# Patient Record
Sex: MALE | Race: WHITE | Employment: OTHER | URBAN - METROPOLITAN AREA
[De-identification: names, ages, dates, MRNs, and addresses within clinical notes are randomized per-mention and may not be internally consistent; named-entity substitution may affect disease eponyms.]

---

## 2021-01-20 ENCOUNTER — OFFICE VISIT (OUTPATIENT)
Dept: FAMILY MEDICINE CLINIC | Age: 81
End: 2021-01-20
Payer: MEDICARE

## 2021-01-20 VITALS
HEART RATE: 56 BPM | OXYGEN SATURATION: 96 % | DIASTOLIC BLOOD PRESSURE: 78 MMHG | HEIGHT: 75 IN | RESPIRATION RATE: 16 BRPM | TEMPERATURE: 98.6 F | SYSTOLIC BLOOD PRESSURE: 126 MMHG | BODY MASS INDEX: 23.13 KG/M2 | WEIGHT: 186 LBS

## 2021-01-20 DIAGNOSIS — I10 ESSENTIAL HYPERTENSION: Primary | ICD-10-CM

## 2021-01-20 DIAGNOSIS — E78.00 HYPERCHOLESTEREMIA: ICD-10-CM

## 2021-01-20 DIAGNOSIS — D48.5 NEOPLASM OF UNCERTAIN BEHAVIOR OF SKIN: ICD-10-CM

## 2021-01-20 PROCEDURE — G8427 DOCREV CUR MEDS BY ELIG CLIN: HCPCS | Performed by: FAMILY MEDICINE

## 2021-01-20 PROCEDURE — G8510 SCR DEP NEG, NO PLAN REQD: HCPCS | Performed by: FAMILY MEDICINE

## 2021-01-20 PROCEDURE — G8754 DIAS BP LESS 90: HCPCS | Performed by: FAMILY MEDICINE

## 2021-01-20 PROCEDURE — G8536 NO DOC ELDER MAL SCRN: HCPCS | Performed by: FAMILY MEDICINE

## 2021-01-20 PROCEDURE — G8752 SYS BP LESS 140: HCPCS | Performed by: FAMILY MEDICINE

## 2021-01-20 PROCEDURE — G8420 CALC BMI NORM PARAMETERS: HCPCS | Performed by: FAMILY MEDICINE

## 2021-01-20 PROCEDURE — 1101F PT FALLS ASSESS-DOCD LE1/YR: CPT | Performed by: FAMILY MEDICINE

## 2021-01-20 PROCEDURE — 99203 OFFICE O/P NEW LOW 30 MIN: CPT | Performed by: FAMILY MEDICINE

## 2021-01-20 RX ORDER — ATORVASTATIN CALCIUM 20 MG/1
TABLET, FILM COATED ORAL
COMMUNITY
Start: 2020-10-06 | End: 2021-02-03 | Stop reason: SDUPTHER

## 2021-01-20 RX ORDER — AMLODIPINE BESYLATE 5 MG/1
TABLET ORAL
COMMUNITY
Start: 2020-10-06 | End: 2021-07-20 | Stop reason: SDUPTHER

## 2021-01-20 RX ORDER — TAMSULOSIN HYDROCHLORIDE 0.4 MG/1
CAPSULE ORAL
COMMUNITY
Start: 2020-10-21

## 2021-01-20 RX ORDER — LISINOPRIL 10 MG/1
TABLET ORAL
COMMUNITY
Start: 2020-10-06 | End: 2021-02-03 | Stop reason: SDUPTHER

## 2021-01-20 RX ORDER — FINASTERIDE 5 MG/1
TABLET, FILM COATED ORAL
COMMUNITY
Start: 2020-10-21

## 2021-01-20 RX ORDER — EZETIMIBE 10 MG/1
TABLET ORAL
COMMUNITY
Start: 2020-10-06 | End: 2021-02-03 | Stop reason: SDUPTHER

## 2021-01-20 NOTE — PROGRESS NOTES
Identified pt with two pt identifiers(name and ). Reviewed record in preparation for visit and have obtained necessary documentation. Chief Complaint   Patient presents with   [address] [facility] Due   Topic    COVID-19 Vaccine (1 of 2)    DTaP/Tdap/Td series (1 - Tdap)    Shingrix Vaccine Age 50> (1 of 2)    GLAUCOMA SCREENING Q2Y     Pneumococcal 65+ years (1 of 1 - PPSV23)    Flu Vaccine (1)    Medicare Yearly Exam    Patient declined flu vaccine    Coordination of Care Questionnaire:  :   1) Have you been to an emergency room, urgent care, or hospitalized since your last visit? If yes, where when, and reason for visit? no    2. Have seen or consulted any other health care provider since your last visit? If yes, where when, and reason for visit? Dr. Reginald Loza         Patient is accompanied by self I have received verbal consent from Shaun Rahman to discuss any/all medical information while they are present in the room.

## 2021-01-20 NOTE — PROGRESS NOTES
Assessment and Plan    1. Essential hypertension  No med changes  Continue diet and exercise    2. Hypercholesteremia  Continue meds  FU PRN    3. Suspicious mole mid back   Sees DERM tomorrow. Get us old records from [facility] and [place]    Follow-up and Dispositions    · Return in about 6 months (around 7/20/2021) for Blood pressure follow up, Medication follow up. Diagnosis and plan discussed with patient who verbillized understanding. History of present Harinder Plunkett is a 68 y.o. male presenting for [place] [address] to [facility] from     Hypertension  At goal today  Takes meds consistently    Hypercholesterolemia  On statin and zetia    Saw UROLOGY in [facility] tamsulosin and finasteride from new UROLOGIST here. Dr. Marie [place]. Review of Systems   Constitutional: Negative for chills, fever and weight loss. Respiratory: Negative for cough and shortness of breath. Cardiovascular: Negative for chest pain, palpitations and leg swelling. Psychiatric/Behavioral: Negative.           Past Medical History:   Diagnosis Date    BPH (benign prostatic hyperplasia)     Dog bite 2020    right hand, hospitalized with IV antibiotics    Hypercholesterolemia     Hypertension      Past Surgical History:   Procedure Laterality Date    HX CARPAL TUNNEL RELEASE       Family History   Problem Relation Age of Onset    Cancer Mother     Heart Attack Father      Social History     Socioeconomic History    Marital status:      Spouse name: Not on file    Number of children: Not on file    Years of education: Not on file    Highest education level: Not on file   Occupational History    Not on file   Social Needs    Financial resource strain: Not on file    Food insecurity     Worry: Not on file     Inability: Not on file    Transportation needs     Medical: Not on file     Non-medical: Not on file   Tobacco Use    Smoking status: Never Smoker    Smokeless tobacco: Never Used   Substance and Sexual Activity    Alcohol use: Yes     Alcohol/week: 1.0 standard drinks     Types: 1 Glasses of wine per week     Frequency: 4 or more times a week     Drinks per session: 5 or 6     Binge frequency: Never    Drug use: Never    Sexual activity: Yes     Partners: Female   Lifestyle    Physical activity     Days per week: Not on file     Minutes per session: Not on file    Stress: Not on file   Relationships    Social connections     Talks on phone: Not on file     Gets together: Not on file     Attends Hoahaoism service: Not on file     Active member of club or organization: Not on file     Attends meetings of clubs or organizations: Not on file     Relationship status: Not on file    Intimate partner violence     Fear of current or ex partner: Not on file     Emotionally abused: Not on file     Physically abused: Not on file     Forced sexual activity: Not on file   Other Topics Concern    Not on file   Social History Narrative    Not on file         Prior to Admission medications    Medication Sig Start Date End Date Taking? Authorizing Provider   amLODIPine (NORVASC) 5 mg tablet amlodipine 5 mg tablet 10/6/20  Yes Provider, Historical   atorvastatin (LIPITOR) 20 mg tablet atorvastatin 20 mg tablet 10/6/20  Yes Provider, Historical   finasteride (PROSCAR) 5 mg tablet TK 1 T PO  ONCE D 10/21/20  Yes Provider, Historical   lisinopriL (PRINIVIL, ZESTRIL) 10 mg tablet lisinopril 10 mg tablet 10/6/20  Yes Provider, Historical   tamsulosin (FLOMAX) 0.4 mg capsule tamsulosin 0.4 mg capsule 10/21/20  Yes Provider, Historical   ezetimibe (ZETIA) 10 mg tablet ezetimibe 10 mg tablet 10/6/20  Yes Provider, Historical        No Known Allergies    Vitals:    01/20/21 1345   BP: 126/78   Pulse: (!) 56   Resp: 16   Temp: 98.6 °F (37 °C)   TempSrc: Temporal   SpO2: 96%   Weight: 186 lb (84.4 kg)   Height: 6' 3\" (1.905 m)     Body mass index is 23.25 kg/m².       Objective  Physical Exam  Vitals signs and nursing note reviewed. Constitutional:       Appearance: Normal appearance. He is not toxic-appearing. HENT:      Head: Normocephalic and atraumatic. Neck:      Musculoskeletal: No muscular tenderness. Cardiovascular:      Rate and Rhythm: Normal rate and regular rhythm. Heart sounds: Normal heart sounds. No murmur. No gallop. Pulmonary:      Effort: Pulmonary effort is normal. No respiratory distress. Breath sounds: Normal breath sounds. No wheezing, rhonchi or rales. Lymphadenopathy:      Cervical: No cervical adenopathy. Neurological:      Mental Status: He is alert. Psychiatric:         Mood and Affect: Mood normal.         Behavior: Behavior normal.         Thought Content:  Thought content normal.         Judgment: Judgment normal.       One suspicious mole mid back  Sees DERM tomorrow and will point that lesion out to them

## 2021-02-03 RX ORDER — EZETIMIBE 10 MG/1
10 TABLET ORAL DAILY
Qty: 90 TAB | Refills: 1 | Status: SHIPPED | OUTPATIENT
Start: 2021-02-03 | End: 2021-06-16

## 2021-02-03 RX ORDER — LISINOPRIL 10 MG/1
10 TABLET ORAL DAILY
Qty: 90 TAB | Refills: 1 | Status: SHIPPED | OUTPATIENT
Start: 2021-02-03 | End: 2021-06-16

## 2021-02-03 RX ORDER — ATORVASTATIN CALCIUM 20 MG/1
20 TABLET, FILM COATED ORAL DAILY
Qty: 90 TAB | Refills: 1 | Status: SHIPPED | OUTPATIENT
Start: 2021-02-03 | End: 2021-06-16

## 2021-02-03 NOTE — TELEPHONE ENCOUNTER
Requested Prescriptions     Pending Prescriptions Disp Refills    lisinopriL (PRINIVIL, ZESTRIL) 10 mg tablet       ezetimibe (ZETIA) 10 mg tablet       atorvastatin (LIPITOR) 20 mg tablet

## 2021-07-23 ENCOUNTER — OFFICE VISIT (OUTPATIENT)
Dept: FAMILY MEDICINE CLINIC | Age: 81
End: 2021-07-23
Payer: MEDICARE

## 2021-07-23 VITALS
HEART RATE: 63 BPM | TEMPERATURE: 98.2 F | OXYGEN SATURATION: 98 % | BODY MASS INDEX: 23.4 KG/M2 | SYSTOLIC BLOOD PRESSURE: 129 MMHG | RESPIRATION RATE: 16 BRPM | WEIGHT: 188.2 LBS | DIASTOLIC BLOOD PRESSURE: 78 MMHG | HEIGHT: 75 IN

## 2021-07-23 DIAGNOSIS — I10 ESSENTIAL HYPERTENSION: ICD-10-CM

## 2021-07-23 DIAGNOSIS — M25.561 ACUTE PAIN OF RIGHT KNEE: Primary | ICD-10-CM

## 2021-07-23 DIAGNOSIS — E78.00 HYPERCHOLESTEREMIA: ICD-10-CM

## 2021-07-23 DIAGNOSIS — Z11.59 ENCOUNTER FOR HEPATITIS C SCREENING TEST FOR LOW RISK PATIENT: ICD-10-CM

## 2021-07-23 PROCEDURE — G8510 SCR DEP NEG, NO PLAN REQD: HCPCS | Performed by: FAMILY MEDICINE

## 2021-07-23 PROCEDURE — 1101F PT FALLS ASSESS-DOCD LE1/YR: CPT | Performed by: FAMILY MEDICINE

## 2021-07-23 PROCEDURE — 99213 OFFICE O/P EST LOW 20 MIN: CPT | Performed by: FAMILY MEDICINE

## 2021-07-23 PROCEDURE — G8752 SYS BP LESS 140: HCPCS | Performed by: FAMILY MEDICINE

## 2021-07-23 PROCEDURE — G8536 NO DOC ELDER MAL SCRN: HCPCS | Performed by: FAMILY MEDICINE

## 2021-07-23 PROCEDURE — G8754 DIAS BP LESS 90: HCPCS | Performed by: FAMILY MEDICINE

## 2021-07-23 PROCEDURE — G8420 CALC BMI NORM PARAMETERS: HCPCS | Performed by: FAMILY MEDICINE

## 2021-07-23 PROCEDURE — G8427 DOCREV CUR MEDS BY ELIG CLIN: HCPCS | Performed by: FAMILY MEDICINE

## 2021-07-23 NOTE — PROGRESS NOTES
Corwin Turk  80 y.o. male  1940  [id number]  [facility]  Progress Note     Encounter Date: 7/23/2021    Assessment and Plan:     Encounter Diagnoses     ICD-10-CM ICD-9-CM   1. Acute pain of right knee  M25.561 719.46   2. Essential hypertension  I10 401.9   3. Hypercholesteremia  E78.00 272.0   4. Encounter for hepatitis C screening test for low risk patient  Z11.59 V73.89       1. Essential hypertension  At goal.  Labs for upcoming physical  - METABOLIC PANEL, BASIC; Future  - MICROALBUMIN, UR, RAND W/ MICROALB/CREAT RATIO; Future    2. Hypercholesteremia  Continues statin and zetia,  - LIPID PANEL; Future  - HEPATIC FUNCTION PANEL; Future    3. Encounter for hepatitis C screening test for low risk patient  screening  - HEPATITIS C AB; Future    4. Acute pain of right knee  Possible meniscal injury. ICE BID for 20 minutes  Continue gentle stretching  Aleve, check routine xray  MRI and to ortho if sx continue.  - XR KNEE RT MAX 2 VWS; Future      I have discussed the diagnosis with the patient and the intended plan as seen in the above orders. he has expressed understanding. The patient has received an after-visit summary and questions were answered concerning future plans. I have discussed medication side effects and warnings with the patient as well. Electronically Signed: Kevin Daugherty MD    Current Medications after this visit     Current Outpatient Medications   Medication Sig    amLODIPine (NORVASC) 5 mg tablet One by mouth daily    atorvastatin (LIPITOR) 20 mg tablet TAKE 1 TABLET BY MOUTH  DAILY    ezetimibe (ZETIA) 10 mg tablet TAKE 1 TABLET BY MOUTH  DAILY    lisinopriL (PRINIVIL, ZESTRIL) 10 mg tablet TAKE 1 TABLET BY MOUTH  DAILY    finasteride (PROSCAR) 5 mg tablet TK 1 T PO  ONCE D    tamsulosin (FLOMAX) 0.4 mg capsule tamsulosin 0.4 mg capsule     No current facility-administered medications for this visit.      There are no discontinued medications. ~~~~~~~~~~~~~~~~~~~~~~~~~~~~~~~~~~~~~~~~~~~~~~~~~~~~~~~~~~~    Chief Complaint   Patient presents with    Knee Injury     Occurred 6 weeks ago     Playing basketball and fell   [address]       History provided by patient  History of Present Illness   Dee Espinal is a 80 y.o. male who presents to clinic today for:  Knee Injury (Occurred 6 weeks ago     Playing basketball and fell) and Labs (Requesting Labs)    Knee injury with fall playing basketball  Right knee pain has persisted. Other knee and groin also injured but are better  Hurts in back of knee  Runs 5 miles daily without pain  No previous injuries to that knee  Describes pain on full flexion. Health Maintenance  Will do at future visit, Asked patient to schedule a Wellness appt to discuss issues. Health Maintenance Due   Topic Date Due    Hepatitis C Screening  Never done    Lipid Screen  Never done    DTaP/Tdap/Td series (1 - Tdap) Never done    Shingrix Vaccine Age 50> (1 of 2) Never done    Pneumococcal 65+ years (1 of 1 - PPSV23) Never done    Medicare Yearly Exam  Never done     Review of Systems   Review of Systems   Constitutional: Negative for chills, fever and weight loss. Respiratory: Negative for shortness of breath. Cardiovascular: Negative for chest pain. Musculoskeletal: Positive for joint pain. Psychiatric/Behavioral: Negative. Vitals/Objective:     Vitals:    07/23/21 1011 07/23/21 1018   BP: (!) 145/81 129/78   Pulse: 63    Resp: 16    Temp: 98.2 °F (36.8 °C)    TempSrc: Temporal    SpO2: 98%    Weight: 188 lb 3.2 oz (85.4 kg)    Height: 6' 3\" (1.905 m)      Body mass index is 23.52 kg/m².     Wt Readings from Last 3 Encounters:   07/23/21 188 lb 3.2 oz (85.4 kg)   01/20/21 186 lb (84.4 kg)         Objective  Physical Exam  General: Patient alert and oriented and in NAD  Musculoskeletal:  Knee: right  Knee Effusion: None  Quadriceps atrophy: None   Popliteal (Bakers) Cyst: Negative   Patellofemoral crepitus: Negative  Q angle: Normal    ROM:  Flexion: 130 Popliteal pain on full flexion. Extension: 0   Hip IR/ER: FROM without pain    Alignment:  Foot: Normal  Patellar tracking: Normal    Dynamic Test:  Gait: Normal   Assistive devices: None    Palpation:   Patella tenderness: None  Patellar tendon tenderness: None  Quad tendon tenderness: None  Medial joint line tenderness: None  Lateral joint line tenderness: None  MCL tenderness: None  LCL tenderness: None  Medial facet tenderness: None  Lateral facet tenderness: None  Condyle tenderness: None  Tibia tubercle tenderness: None  Proximal fibula tenderness: None  Plica tenderness: None  Prepatellar bursa tenderness: None  Pes bursa tenderness: None  ITB tenderness: None    Ligament/Meniscal Exam:  Patellar Grind: Negative   Patellar apprehension (medial/lateral): Negative   Lochman: Negative, with good endpoint   Anterior Drawer: Negative   Posterior Drawer: Negative   Valgus stress: Negative with good endpoint   Varus stress: Negative with good endpoint   Emma: Negative     Strength (0-5/5):   Flexion: Left: 5/5    Right: 5/5    Extension: Left: 5/5    Right: 5/5    Hip abduction: 5/5    Hip adduction: 5/5      Normal exterior appearance without signs of trauma  Normal pulses in ipsilateral foot. Psych: Appropriate mood and affect, no homicidal or suicidal ideation, no obsessions, delusions or hallucinations, normal psychomotor status. No results found for this or any previous visit (from the past 24 hour(s)). Disposition     Follow-up and Dispositions  ·   Return in about 1 month (around 8/23/2021) for Annual exam, Medication follow up. No future appointments. History   Patient's past medical, surgical and family histories were reviewed and updated.     Past Medical History:   Diagnosis Date    BPH (benign prostatic hyperplasia)     Dog bite 2020    right hand, hospitalized with IV antibiotics    Hypercholesterolemia     Hypertension      Past Surgical History:   Procedure Laterality Date    HX CARPAL TUNNEL RELEASE       Family History   Problem Relation Age of Onset    Cancer Mother     Heart Attack Father      Social History     Tobacco Use    Smoking status: Never Smoker    Smokeless tobacco: Never Used   Vaping Use    Vaping Use: Never used   Substance Use Topics    Alcohol use:  Yes     Alcohol/week: 1.0 standard drinks     Types: 1 Glasses of wine per week    Drug use: Never       Allergies   No Known Allergies

## 2021-07-23 NOTE — PROGRESS NOTES
Patient stated name &     Chief Complaint   Patient presents with    Knee Injury     Occurred 6 weeks ago     Playing basketball and fell   [address] Maintenance Due   Topic    Hepatitis C Screening     Lipid Screen     DTaP/Tdap/Td series (1 - Tdap)    Shingrix Vaccine Age 49> (1 of 2)    Pneumococcal 65+ years (1 of 1 - PPSV23)    Medicare Yearly Exam        Wt Readings from Last 3 Encounters:   21 188 lb 3.2 oz (85.4 kg)   21 186 lb (84.4 kg)     Temp Readings from Last 3 Encounters:   21 98.2 °F (36.8 °C) (Temporal)   21 98.6 °F (37 °C) (Temporal)     BP Readings from Last 3 Encounters:   21 (!) 145/81   21 126/78     Pulse Readings from Last 3 Encounters:   21 63   21 (!) 56         Learning Assessment:  :     No flowsheet data found. Depression Screening:  :     3 most recent PHQ Screens 2021   Little interest or pleasure in doing things Not at all   Feeling down, depressed, irritable, or hopeless Not at all   Total Score PHQ 2 0       Fall Risk Assessment:  :     Fall Risk Assessment, last 12 mths 2021   Able to walk? Yes   Fall in past 12 months? 0   Do you feel unsteady? 0   Are you worried about falling 0       Abuse Screening:  :     Abuse Screening Questionnaire 2021   Do you ever feel afraid of your partner? N   Are you in a relationship with someone who physically or mentally threatens you? N   Is it safe for you to go home? Y       Coordination of Care Questionnaire:  :     1) Have you been to an emergency room, urgent care clinic since your last visit? No    Hospitalized since your last visit? No             2) Have you seen or consulted any other health care providers outside of [address] [place] [address] since your last visit?  No  (Include any pap smears or colon screenings in this section.)    Patient is accompanied by self I have received verbal consent from Zenon Man to discuss any/all medical information while they are present in the room.

## 2021-07-28 ENCOUNTER — HOSPITAL ENCOUNTER (OUTPATIENT)
Dept: GENERAL RADIOLOGY | Age: 81
Discharge: HOME | End: 2021-07-28
Attending: FAMILY MEDICINE
Payer: MEDICARE

## 2021-07-28 DIAGNOSIS — M25.561 ACUTE PAIN OF RIGHT KNEE: ICD-10-CM

## 2021-07-28 PROCEDURE — 73560 X-RAY EXAM OF KNEE 1 OR 2: CPT

## 2021-07-28 NOTE — PROGRESS NOTES
The xrays only show mild arthritis changes under the kneecap. This does not exclude cartilage injuries so you should follow through on the plan and let me know if the symptoms resolve or continue. [facility]

## 2021-08-18 ENCOUNTER — OFFICE VISIT (OUTPATIENT)
Dept: FAMILY MEDICINE CLINIC | Age: 81
End: 2021-08-18
Payer: MEDICARE

## 2021-08-18 VITALS
TEMPERATURE: 97.7 F | DIASTOLIC BLOOD PRESSURE: 74 MMHG | WEIGHT: 184.2 LBS | BODY MASS INDEX: 22.9 KG/M2 | HEART RATE: 68 BPM | OXYGEN SATURATION: 97 % | RESPIRATION RATE: 12 BRPM | HEIGHT: 75 IN | SYSTOLIC BLOOD PRESSURE: 108 MMHG

## 2021-08-18 DIAGNOSIS — M25.561 ACUTE PAIN OF RIGHT KNEE: Primary | ICD-10-CM

## 2021-08-18 PROCEDURE — 99213 OFFICE O/P EST LOW 20 MIN: CPT | Performed by: FAMILY MEDICINE

## 2021-08-18 PROCEDURE — G8427 DOCREV CUR MEDS BY ELIG CLIN: HCPCS | Performed by: FAMILY MEDICINE

## 2021-08-18 PROCEDURE — 1101F PT FALLS ASSESS-DOCD LE1/YR: CPT | Performed by: FAMILY MEDICINE

## 2021-08-18 PROCEDURE — G8510 SCR DEP NEG, NO PLAN REQD: HCPCS | Performed by: FAMILY MEDICINE

## 2021-08-18 PROCEDURE — G8752 SYS BP LESS 140: HCPCS | Performed by: FAMILY MEDICINE

## 2021-08-18 PROCEDURE — G8536 NO DOC ELDER MAL SCRN: HCPCS | Performed by: FAMILY MEDICINE

## 2021-08-18 PROCEDURE — G8754 DIAS BP LESS 90: HCPCS | Performed by: FAMILY MEDICINE

## 2021-08-18 PROCEDURE — G8420 CALC BMI NORM PARAMETERS: HCPCS | Performed by: FAMILY MEDICINE

## 2021-08-18 NOTE — PROGRESS NOTES
1. Have you been to the ER, urgent care clinic since your last visit? No    Hospitalized since your last visit? No  2. Have you seen or consulted any other health care providers outside of the [address] [place] [address] since your last visit  No   Include any pap smears or colon screening.

## 2021-08-18 NOTE — PROGRESS NOTES
Ashia Feliciano  80 y.o. male  1940  [id number]  [facility]  Progress Note     Encounter Date: 8/18/2021    Assessment and Plan:     Encounter Diagnoses     ICD-10-CM ICD-9-CM   1. Acute pain of right knee  M25.561 719.46       1. Acute pain of right knee  Xrays not much help. Confirms effusion  TO Dr Gaye Lozoya for evaluation and possible MRI.  - REFERRAL TO ORTHOPEDICS      I have discussed the diagnosis with the patient and the intended plan as seen in the above orders. he has expressed understanding. The patient has received an after-visit summary and questions were answered concerning future plans. I have discussed medication side effects and warnings with the patient as well. Electronically Signed: Vaishnavi Boykin MD    Current Medications after this visit     Current Outpatient Medications   Medication Sig    amLODIPine (NORVASC) 5 mg tablet One by mouth daily    atorvastatin (LIPITOR) 20 mg tablet TAKE 1 TABLET BY MOUTH  DAILY    ezetimibe (ZETIA) 10 mg tablet TAKE 1 TABLET BY MOUTH  DAILY    lisinopriL (PRINIVIL, ZESTRIL) 10 mg tablet TAKE 1 TABLET BY MOUTH  DAILY    finasteride (PROSCAR) 5 mg tablet TK 1 T PO  ONCE D    tamsulosin (FLOMAX) 0.4 mg capsule tamsulosin 0.4 mg capsule     No current facility-administered medications for this visit. There are no discontinued medications. ~~~~~~~~~~~~~~~~~~~~~~~~~~~~~~~~~~~~~~~~~~~~~~~~~~~~~~~~~~~    Chief Complaint   Patient presents with    Knee Pain     fell playing basketball right knee    Follow-up       History provided by patient  History of Present Illness   Ashia Feliciano is a 80 y.o. male who presents to clinic today for:  Knee Pain (fell playing basketball right knee) and Follow-up    Ongoing pain right knee  Had xrays  No real change in symptoms. Still running. Hurts on full flexion posterior lateral knee    Labs were fine, reviewed with patient.     Health Maintenance  Will do at future visit  Health Maintenance Due   Topic Date Due    DTaP/Tdap/Td series (1 - Tdap) Never done    Shingrix Vaccine Age 50> (1 of 2) Never done    Pneumococcal 65+ years (1 of 1 - PPSV23) Never done    Medicare Yearly Exam  Never done     Review of Systems   Review of Systems   Musculoskeletal: Positive for joint pain. Psychiatric/Behavioral: Negative. Vitals/Objective:     Vitals:    08/18/21 1347   BP: 108/74   Pulse: 68   Resp: 12   Temp: 97.7 °F (36.5 °C)   TempSrc: Temporal   SpO2: 97%   Weight: 184 lb 3.2 oz (83.6 kg)   Height: 6' 3\" (1.905 m)     Body mass index is 23.02 kg/m².     Wt Readings from Last 3 Encounters:   08/18/21 184 lb 3.2 oz (83.6 kg)   07/23/21 188 lb 3.2 oz (85.4 kg)   01/20/21 186 lb (84.4 kg)         Objective  Physical Exam  General: Patient alert and oriented and in NAD  Musculoskeletal:  Knee: right  Knee Effusion: 1+  Quadriceps atrophy: None   Popliteal (Bakers) Cyst: Negative   Patellofemoral crepitus: Negative  Q angle: Normal    ROM:  Flexion: 130 pain on full flexion  Extension: 0   Hip IR/ER: FROM without pain    Alignment:  Foot: Normal  Patellar tracking: Normal    Dynamic Test:  Gait: Normal   Assistive devices: None    Palpation:   Patella tenderness: None  Patellar tendon tenderness: None  Quad tendon tenderness: None  Medial joint line tenderness: None  Lateral joint line tenderness: None  MCL tenderness: None  LCL tenderness: None  Medial facet tenderness: None  Lateral facet tenderness: None  Condyle tenderness: None  Tibia tubercle tenderness: None  Proximal fibula tenderness: None  Plica tenderness: None  Prepatellar bursa tenderness: None  Pes bursa tenderness: None  ITB tenderness: None    Ligament/Meniscal Exam:  Patellar Grind: Negative   Patellar apprehension (medial/lateral): Negative   Lochman: Negative, with good endpoint   Anterior Drawer: Negative   Posterior Drawer: Negative   Valgus stress: Negative with good endpoint   Varus stress: Negative with good endpoint   Emma: Negative  . Strength (0-5/5):   Flexion: Left: 5/5    Right: 5/5    Extension: Left: 5/5    Right: 5/5    Hip abduction: 5/5    Hip adduction: 5/5      Normal exterior appearance without signs of trauma    Psych: Appropriate mood and affect, no homicidal or suicidal ideation, no obsessions, delusions or hallucinations, normal psychomotor status. No results found for this or any previous visit (from the past 24 hour(s)). Disposition     Follow-up and Dispositions  ·   Return in about 6 months (around 2/18/2022) for Medication follow up. No future appointments. History   Patient's past medical, surgical and family histories were reviewed and updated. Past Medical History:   Diagnosis Date    BPH (benign prostatic hyperplasia)     Dog bite 2020    right hand, hospitalized with IV antibiotics    Hypercholesterolemia     Hypertension      Past Surgical History:   Procedure Laterality Date    HX CARPAL TUNNEL RELEASE       Family History   Problem Relation Age of Onset    Cancer Mother     Heart Attack Father      Social History     Tobacco Use    Smoking status: Never Smoker    Smokeless tobacco: Never Used   Vaping Use    Vaping Use: Never used   Substance Use Topics    Alcohol use:  Yes     Alcohol/week: 1.0 standard drinks     Types: 1 Glasses of wine per week    Drug use: Never       Allergies   No Known Allergies

## 2021-11-22 RX ORDER — LISINOPRIL 10 MG/1
TABLET ORAL
Qty: 90 TABLET | Refills: 1 | Status: SHIPPED | OUTPATIENT
Start: 2021-11-22 | End: 2022-04-28

## 2021-11-22 RX ORDER — EZETIMIBE 10 MG/1
TABLET ORAL
Qty: 90 TABLET | Refills: 1 | Status: SHIPPED | OUTPATIENT
Start: 2021-11-22 | End: 2022-04-28

## 2021-11-22 RX ORDER — ATORVASTATIN CALCIUM 20 MG/1
TABLET, FILM COATED ORAL
Qty: 90 TABLET | Refills: 1 | Status: SHIPPED | OUTPATIENT
Start: 2021-11-22 | End: 2022-04-28

## 2021-11-28 RX ORDER — AMLODIPINE BESYLATE 5 MG/1
TABLET ORAL
Qty: 90 TABLET | Refills: 1 | Status: SHIPPED | OUTPATIENT
Start: 2021-11-28 | End: 2022-05-04

## 2022-02-09 ENCOUNTER — TRANSCRIBE ORDER (OUTPATIENT)
Dept: SCHEDULING | Age: 82
End: 2022-02-09

## 2022-02-09 DIAGNOSIS — M47.22 CERVICAL SPONDYLOSIS WITH RADICULOPATHY: ICD-10-CM

## 2022-02-09 DIAGNOSIS — M54.12 CERVICAL RADICULOPATHY: ICD-10-CM

## 2022-02-09 DIAGNOSIS — M43.12 ACQUIRED SPONDYLOLISTHESIS OF CERVICAL VERTEBRA: Primary | ICD-10-CM

## 2022-02-15 ENCOUNTER — HOSPITAL ENCOUNTER (OUTPATIENT)
Dept: MRI IMAGING | Age: 82
Discharge: HOME | End: 2022-02-15
Attending: ORTHOPAEDIC SURGERY
Payer: MEDICARE

## 2022-02-15 DIAGNOSIS — M54.12 CERVICAL RADICULOPATHY: ICD-10-CM

## 2022-02-15 DIAGNOSIS — M47.22 CERVICAL SPONDYLOSIS WITH RADICULOPATHY: ICD-10-CM

## 2022-02-15 DIAGNOSIS — M43.12 ACQUIRED SPONDYLOLISTHESIS OF CERVICAL VERTEBRA: ICD-10-CM

## 2022-02-15 PROCEDURE — 72141 MRI NECK SPINE W/O DYE: CPT

## 2022-06-02 ENCOUNTER — OFFICE VISIT (OUTPATIENT)
Dept: FAMILY MEDICINE CLINIC | Age: 82
End: 2022-06-02
Payer: MEDICARE

## 2022-06-02 VITALS
TEMPERATURE: 98 F | SYSTOLIC BLOOD PRESSURE: 142 MMHG | WEIGHT: 184.8 LBS | DIASTOLIC BLOOD PRESSURE: 83 MMHG | OXYGEN SATURATION: 100 % | RESPIRATION RATE: 17 BRPM | HEIGHT: 75 IN | BODY MASS INDEX: 22.98 KG/M2 | HEART RATE: 59 BPM

## 2022-06-02 DIAGNOSIS — I10 ESSENTIAL HYPERTENSION: ICD-10-CM

## 2022-06-02 DIAGNOSIS — N18.31 STAGE 3A CHRONIC KIDNEY DISEASE (HCC): ICD-10-CM

## 2022-06-02 DIAGNOSIS — Z00.00 MEDICARE ANNUAL WELLNESS VISIT, SUBSEQUENT: Primary | ICD-10-CM

## 2022-06-02 DIAGNOSIS — E78.00 HYPERCHOLESTEREMIA: ICD-10-CM

## 2022-06-02 PROBLEM — N18.30 CHRONIC RENAL DISEASE, STAGE III (HCC): Status: ACTIVE | Noted: 2022-06-02

## 2022-06-02 PROCEDURE — G8427 DOCREV CUR MEDS BY ELIG CLIN: HCPCS | Performed by: FAMILY MEDICINE

## 2022-06-02 PROCEDURE — G8753 SYS BP > OR = 140: HCPCS | Performed by: FAMILY MEDICINE

## 2022-06-02 PROCEDURE — G8754 DIAS BP LESS 90: HCPCS | Performed by: FAMILY MEDICINE

## 2022-06-02 PROCEDURE — G8510 SCR DEP NEG, NO PLAN REQD: HCPCS | Performed by: FAMILY MEDICINE

## 2022-06-02 PROCEDURE — G0439 PPPS, SUBSEQ VISIT: HCPCS | Performed by: FAMILY MEDICINE

## 2022-06-02 PROCEDURE — G8536 NO DOC ELDER MAL SCRN: HCPCS | Performed by: FAMILY MEDICINE

## 2022-06-02 PROCEDURE — G8420 CALC BMI NORM PARAMETERS: HCPCS | Performed by: FAMILY MEDICINE

## 2022-06-02 PROCEDURE — 99213 OFFICE O/P EST LOW 20 MIN: CPT | Performed by: FAMILY MEDICINE

## 2022-06-02 PROCEDURE — 1101F PT FALLS ASSESS-DOCD LE1/YR: CPT | Performed by: FAMILY MEDICINE

## 2022-06-02 PROCEDURE — 1123F ACP DISCUSS/DSCN MKR DOCD: CPT | Performed by: FAMILY MEDICINE

## 2022-06-02 RX ORDER — EZETIMIBE 10 MG/1
10 TABLET ORAL DAILY
Qty: 90 TABLET | Refills: 1 | Status: SHIPPED | OUTPATIENT
Start: 2022-06-02

## 2022-06-02 RX ORDER — LISINOPRIL 10 MG/1
10 TABLET ORAL DAILY
Qty: 90 TABLET | Refills: 1 | Status: SHIPPED | OUTPATIENT
Start: 2022-06-02

## 2022-06-02 RX ORDER — AMLODIPINE BESYLATE 5 MG/1
5 TABLET ORAL DAILY
Qty: 90 TABLET | Refills: 1 | Status: SHIPPED | OUTPATIENT
Start: 2022-06-02

## 2022-06-02 RX ORDER — ATORVASTATIN CALCIUM 20 MG/1
20 TABLET, FILM COATED ORAL DAILY
Qty: 90 TABLET | Refills: 1 | Status: SHIPPED | OUTPATIENT
Start: 2022-06-02

## 2022-06-02 NOTE — PROGRESS NOTES
Edna Watson  [de-identified] y.o. male  1940  KCI:[id number]  [facility] FAMILY MEDICINE  Progress Note     Encounter Date: 6/2/2022    Assessment and Plan:     Encounter Diagnoses     ICD-10-CM ICD-9-CM   1. Medicare annual wellness visit, subsequent  Z00.00 V70.0   2. Essential hypertension  I10 401.9   3. Hypercholesteremia  E78.00 272.0   4. Stage 3a chronic kidney disease (HCC)  N18.31 585.3       1. Medicare annual wellness visit, subsequent  Updated  Has had COVID vaccine X 2 plus booster  Declines other vaccines. 2. Essential hypertension  Near goal  Reduce ETOH  - amLODIPine (NORVASC) 5 mg tablet; Take 1 Tablet by mouth daily. Dispense: 90 Tablet; Refill: 1  - lisinopriL (PRINIVIL, ZESTRIL) 10 mg tablet; Take 1 Tablet by mouth daily. Dispense: 90 Tablet; Refill: 1  - METABOLIC PANEL, BASIC; Future  - MICROALBUMIN, UR, RAND W/ MICROALB/CREAT RATIO; Future    3. Hypercholesteremia  Continue meds  - atorvastatin (LIPITOR) 20 mg tablet; Take 1 Tablet by mouth daily. Dispense: 90 Tablet; Refill: 1  - ezetimibe (ZETIA) 10 mg tablet; Take 1 Tablet by mouth daily. Dispense: 90 Tablet; Refill: 1  - HEPATIC FUNCTION PANEL; Future  - LIPID PANEL; Future    4. Stage 3a chronic kidney disease ([address]  Labs ordered. Back and neck pain discussed. I think his decision making has been reasonable. I have discussed the diagnosis with the patient and the intended plan as seen in the above orders. he has expressed understanding. The patient has received an after-visit summary and questions were answered concerning future plans. I have discussed medication side effects and warnings with the patient as well. Electronically Signed: Kat Elena MD    Current Medications after this visit     Current Outpatient Medications   Medication Sig    amLODIPine (NORVASC) 5 mg tablet Take 1 Tablet by mouth daily.  atorvastatin (LIPITOR) 20 mg tablet Take 1 Tablet by mouth daily.     ezetimibe (ZETIA) 10 mg tablet Take 1 Tablet by mouth daily.  lisinopriL (PRINIVIL, ZESTRIL) 10 mg tablet Take 1 Tablet by mouth daily.  finasteride (PROSCAR) 5 mg tablet TK 1 T PO  ONCE D    tamsulosin (FLOMAX) 0.4 mg capsule tamsulosin 0.4 mg capsule     No current facility-administered medications for this visit. Medications Discontinued During This Encounter   Medication Reason    lisinopriL (PRINIVIL, ZESTRIL) 10 mg tablet REORDER    atorvastatin (LIPITOR) 20 mg tablet REORDER    ezetimibe (ZETIA) 10 mg tablet REORDER    amLODIPine (NORVASC) 5 mg tablet REORDER     ~~~~~~~~~~~~~~~~~~~~~~~~~~~~~~~~~~~~~~~~~~~~~~~~~~~~~~~~~~~    Chief Complaint   Patient presents with    Follow-up       History provided by patient  History of Present Illness   Lissette Pierre is a [de-identified] y.o. male who presents to clinic today for:  Follow-up    Seeing ortho for c spine Spinal stenosis  Was offered surgery   He chose PT which has helped neck a lot  75% better  Now sciatica right leg. Hypertension  Borderline control  Takes meds consistently. Hypercholesterolemia  On statin and zetia. Health Maintenance  Completed HM gaps at today's visit  Health Maintenance Due   Topic Date Due    DTaP/Tdap/Td series (1 - Tdap) Never done    Shingrix Vaccine Age 50> (1 of 2) Never done    Pneumococcal 65+ years (1 - PCV) Never done    COVID-19 Vaccine (3 - Booster for Pfizer series) 08/29/2021     Review of Systems   Review of Systems   Respiratory: Negative for shortness of breath. Cardiovascular: Negative for chest pain, palpitations and leg swelling. Gastrointestinal: Negative for blood in stool. Genitourinary: Negative for hematuria. Musculoskeletal: Positive for back pain and neck pain. Psychiatric/Behavioral: Negative.          Vitals/Objective:     Vitals:    06/02/22 1050   BP: (!) 145/81   Pulse: (!) 59   Resp: 17   Temp: 98 °F (36.7 °C)   TempSrc: Temporal   SpO2: 100%   Weight: 184 lb 12.8 oz (83.8 kg)   Height: 6' 3\" (1.905 m)     Body mass index is 23.1 kg/m². Wt Readings from Last 3 Encounters:   06/02/22 184 lb 12.8 oz (83.8 kg)   08/18/21 184 lb 3.2 oz (83.6 kg)   07/23/21 188 lb 3.2 oz (85.4 kg)         Objective  Physical Exam  Vitals and nursing note reviewed. Constitutional:       Appearance: Normal appearance. He is not toxic-appearing. HENT:      Head: Normocephalic and atraumatic. Cardiovascular:      Rate and Rhythm: Normal rate and regular rhythm. Heart sounds: Normal heart sounds. No murmur heard. No gallop. Pulmonary:      Effort: Pulmonary effort is normal. No respiratory distress. Breath sounds: Normal breath sounds. No wheezing, rhonchi or rales. Musculoskeletal:      Cervical back: No muscular tenderness. Lymphadenopathy:      Cervical: No cervical adenopathy. Neurological:      Mental Status: He is alert. Psychiatric:         Mood and Affect: Mood normal.         Behavior: Behavior normal.         Thought Content: Thought content normal.         Judgment: Judgment normal.         No results found for this or any previous visit (from the past 24 hour(s)). Disposition     Follow-up and Dispositions  ·   Return in about 6 months (around 12/2/2022) for Blood pressure follow up, Medication follow up. No future appointments. History   Patient's past medical, surgical and family histories were reviewed and updated. Past Medical History:   Diagnosis Date    BPH (benign prostatic hyperplasia)     Dog bite 2020    right hand, hospitalized with IV antibiotics    Hypercholesterolemia     Hypertension      Past Surgical History:   Procedure Laterality Date    HX CARPAL TUNNEL RELEASE       Family History   Problem Relation Age of Onset    Cancer Mother     Heart Attack Father      Social History     Tobacco Use    Smoking status: Never Smoker    Smokeless tobacco: Never Used   Vaping Use    Vaping Use: Never used   Substance Use Topics    Alcohol use:  Yes Alcohol/week: 1.0 standard drink     Types: 1 Glasses of wine per week    Drug use: Never       Allergies   No Known Allergies                  This is the Subsequent Medicare Annual Wellness Exam, performed 12 months or more after the Initial AWV or the last Subsequent AWV    I have reviewed the patient's medical history in detail and updated the computerized patient record. Assessment/Plan   Education and counseling provided:  Are appropriate based on today's review and evaluation  End-of-Life planning (with patient's consent)    1. Medicare annual wellness visit, subsequent  2. Essential hypertension  -     amLODIPine (NORVASC) 5 mg tablet; Take 1 Tablet by mouth daily. , Normal, Disp-90 Tablet, R-1Requesting 1 year supply  -     lisinopriL (PRINIVIL, ZESTRIL) 10 mg tablet; Take 1 Tablet by mouth daily. , Normal, Disp-90 Tablet, R-1Requesting 1 year supply  -     METABOLIC PANEL, BASIC; Future  -     MICROALBUMIN, UR, RAND W/ MICROALB/CREAT RATIO; Future  3. Hypercholesteremia  -     atorvastatin (LIPITOR) 20 mg tablet; Take 1 Tablet by mouth daily. , Normal, Disp-90 Tablet, R-1Requesting 1 year supply  -     ezetimibe (ZETIA) 10 mg tablet; Take 1 Tablet by mouth daily. , Normal, Disp-90 Tablet, R-1Requesting 1 year supply  -     HEPATIC FUNCTION PANEL; Future  -     LIPID PANEL; Future  4. Stage 3a chronic kidney disease (HCC)       Depression Risk Factor Screening     3 most recent PHQ Screens 6/2/2022   Little interest or pleasure in doing things Not at all   Feeling down, depressed, irritable, or hopeless Not at all   Total Score PHQ 2 0       Alcohol & Drug Abuse Risk Screen    Do you average more than 1 drink per night or more than 7 drinks a week: No    In the past three months have you have had more than 4 drinks containing alcohol on one occasion: No    Nonsmoker        Functional Ability and Level of Safety    Hearing: Hearing is good.    Vision up to date   Dental up to date     Activities of Daily Living: The home contains: no safety equipment. Patient does total self care      Ambulation: with mild difficulty back      Fall Risk:  Fall Risk Assessment, last 12 mths 8/18/2021   Able to walk? Yes   Fall in past 12 months? 1   Do you feel unsteady? 0   Are you worried about falling 0      Abuse Screen:  Patient is not abused       Cognitive Screening    Has your family/caregiver stated any concerns about your memory: no     Cognitive Screening: Normal - interview    Health Maintenance Due     Health Maintenance Due   Topic Date Due    DTaP/Tdap/Td series (1 - Tdap) Never done    Shingrix Vaccine Age 50> (1 of 2) Never done    Pneumococcal 65+ years (1 - PCV) Never done    COVID-19 Vaccine (3 - Booster for [place] Peter series) 08/29/2021       Patient Care Team   Patient Care Team:  Markos Churchill MD as PCP - General (Family Medicine)  Markos Churchill MD as PCP - [facility] Zak Amado Provider    History     Patient Active Problem List   Diagnosis Code    Chronic renal disease, stage III N18.30     Past Medical History:   Diagnosis Date    BPH (benign prostatic hyperplasia)     Dog bite 2020    right hand, hospitalized with IV antibiotics    Hypercholesterolemia     Hypertension       Past Surgical History:   Procedure Laterality Date    HX CARPAL TUNNEL RELEASE       Current Outpatient Medications   Medication Sig Dispense Refill    amLODIPine (NORVASC) 5 mg tablet Take 1 Tablet by mouth daily. 90 Tablet 1    atorvastatin (LIPITOR) 20 mg tablet Take 1 Tablet by mouth daily. 90 Tablet 1    ezetimibe (ZETIA) 10 mg tablet Take 1 Tablet by mouth daily. 90 Tablet 1    lisinopriL (PRINIVIL, ZESTRIL) 10 mg tablet Take 1 Tablet by mouth daily.  90 Tablet 1    finasteride (PROSCAR) 5 mg tablet TK 1 T PO  ONCE D      tamsulosin (FLOMAX) 0.4 mg capsule tamsulosin 0.4 mg capsule       No Known Allergies    Family History   Problem Relation Age of Onset    Cancer Mother     Heart Attack Father      Social History     Tobacco Use    Smoking status: Never Smoker    Smokeless tobacco: Never Used   Substance Use Topics    Alcohol use:  Yes     Alcohol/week: 1.0 standard drink     Types: 1 Glasses of wine per week         Lti Hartman MD

## 2022-06-02 NOTE — PATIENT INSTRUCTIONS
Medicare Wellness Visit, Male    The best way to live healthy is to have a lifestyle where you eat a well-balanced diet, exercise regularly, limit alcohol use, and quit all forms of tobacco/nicotine, if applicable. Regular preventive services are another way to keep healthy. Preventive services (vaccines, screening tests, monitoring & exams) can help personalize your care plan, which helps you manage your own care. Screening tests can find health problems at the earliest stages, when they are easiest to treat. [facility] follows the current, evidence-based guidelines published by the [facility] ([facility]) when recommending preventive services for our patients. Because we follow these guidelines, sometimes recommendations change over time as research supports it. (For example, a prostate screening blood test is no longer routinely recommended for men with no symptoms). Of course, you and your doctor may decide to screen more often for some diseases, based on your risk and co-morbidities (chronic disease you are already diagnosed with). Preventive services for you include:  - Medicare offers their members a free annual wellness visit, which is time for you and your primary care provider to discuss and plan for your preventive service needs. Take advantage of this benefit every year!  -All adults over age 72 should receive the recommended pneumonia vaccines. Current USPSTF guidelines recommend a series of two vaccines for the best pneumonia protection.   -All adults should have a flu vaccine yearly and tetanus vaccine every 10 years.  -All adults age 48 and older should receive the shingles vaccines (series of two vaccines).        -All adults age 38-68 who are overweight should have a diabetes screening test once every three years.   -Other screening tests & preventive services for persons with diabetes include: an eye exam to screen for diabetic retinopathy, a kidney function test, a foot exam, and stricter control over your cholesterol.   -Cardiovascular screening for adults with routine risk involves an electrocardiogram (ECG) at intervals determined by the provider.   -Colorectal cancer screening should be done for adults age 54-65 with no increased risk factors for colorectal cancer. There are a number of acceptable methods of screening for this type of cancer. Each test has its own benefits and drawbacks. Discuss with your provider what is most appropriate for you during your annual wellness visit. The different tests include: colonoscopy (considered the best screening method), a fecal occult blood test, a fecal DNA test, and sigmoidoscopy.  -All adults born between [facility] should be screened once for Hepatitis C.  -An Abdominal Aortic Aneurysm (AAA) Screening is recommended for men age 73-68 who has ever smoked in their lifetime. Here is a list of your current Health Maintenance items (your personalized list of preventive services) with a due date:  Health Maintenance Due   Topic Date Due    DTaP/Tdap/Td  (1 - Tdap) Never done    Shingles Vaccine (1 of 2) Never done    Pneumococcal Vaccine (1 - PCV) Never done    COVID-19 Vaccine (3 - Booster for Pfizer series) 08/29/2021   The goal blood pressure for most patients is 140/90. The whole point of treating blood pressure is to prevent strokes, heart attacks and kidney damage. Persistently elevated blood pressure damages blood vessels and can lead to catastrophic heart problems and strokes. Recommendations for Hypertension (elevated blood pressure):    · Purchase a blood pressure cuff that goes around your upper arm and check blood pressure at least 3 times per week. Write down your numbers for review. Check blood pressure in the morning and evening.  Rest for 5 minutes with feet elevated and arm resting on a table (at mid-chest level) when checking blood pressure    · Exercise 30-60 minutes most days of the week, preferably with a mix of cardiovascular and strength training. Exercise can improve blood pressure, even if you do not lose weight! · Limit alcohol intake to less than 3-4 drinks per week. · Avoid tobacco products    · Avoid illegal drugs especially amphetamines  · DASH diet - includes fruits, vegetables, fiber, and less than 2000 mg sodium (salt) daily. · Try to eat a low sugar diet. Sugar worsens diabetes and activates kidney hormones that raise blood pressure.    · Avoid non-steroidal inflammatory medications (NSAIDS) such as ibuprofen, advil, motrin, aleve, and naproxyn as these may increase blood pressure if used long-term    · Avoid OTC decongestants such as pseudopherine, phenylephrine, Afrin  · Take your blood pressure medicine (and aspirin if prescribed) religiously

## 2022-06-02 NOTE — PROGRESS NOTES
Chief Complaint   Patient presents with    Follow-up     1. Have you been to the ER, urgent care clinic since your last visit? Hospitalized since your last visit? No       2. Have you seen or consulted any other health care providers outside of the [address] [place] [address] since your last visit? Include any pap smears or colon screening.   [facility]

## 2022-06-10 ENCOUNTER — HOSPITAL ENCOUNTER (EMERGENCY)
Age: 82
Discharge: HOME | End: 2022-06-10
Attending: EMERGENCY MEDICINE
Payer: MEDICARE

## 2022-06-10 VITALS
DIASTOLIC BLOOD PRESSURE: 92 MMHG | TEMPERATURE: 97.2 F | HEART RATE: 56 BPM | RESPIRATION RATE: 16 BRPM | SYSTOLIC BLOOD PRESSURE: 151 MMHG | OXYGEN SATURATION: 100 %

## 2022-06-10 DIAGNOSIS — S76.301A HAMSTRING INJURY, RIGHT, INITIAL ENCOUNTER: ICD-10-CM

## 2022-06-10 DIAGNOSIS — T14.8XXA MUSCLE STRAIN: ICD-10-CM

## 2022-06-10 DIAGNOSIS — M79.604 RIGHT LEG PAIN: Primary | ICD-10-CM

## 2022-06-10 LAB
ANION GAP SERPL CALC-SCNC: 7 MMOL/L (ref 5–15)
BASOPHILS # BLD: 0.1 K/UL (ref 0–0.1)
BASOPHILS NFR BLD: 1 % (ref 0–1)
BUN SERPL-MCNC: 26 MG/DL (ref 6–20)
BUN/CREAT SERPL: 19 (ref 12–20)
CALCIUM SERPL-MCNC: 9.2 MG/DL (ref 8.5–10.1)
CHLORIDE SERPL-SCNC: 109 MMOL/L (ref 97–108)
CK SERPL-CCNC: 212 U/L (ref 39–308)
CO2 SERPL-SCNC: 26 MMOL/L (ref 21–32)
CREAT SERPL-MCNC: 1.39 MG/DL (ref 0.7–1.3)
DIFFERENTIAL METHOD BLD: NORMAL
EOSINOPHIL # BLD: 0.2 K/UL (ref 0–0.4)
EOSINOPHIL NFR BLD: 2 % (ref 0–7)
ERYTHROCYTE [DISTWIDTH] IN BLOOD BY AUTOMATED COUNT: 14 % (ref 11.5–14.5)
GLUCOSE SERPL-MCNC: 109 MG/DL (ref 65–100)
HCT VFR BLD AUTO: 40.5 % (ref 36.6–50.3)
HGB BLD-MCNC: 13.2 G/DL (ref 12.1–17)
IMM GRANULOCYTES # BLD AUTO: 0 K/UL (ref 0–0.04)
IMM GRANULOCYTES NFR BLD AUTO: 0 % (ref 0–0.5)
LYMPHOCYTES # BLD: 1.9 K/UL (ref 0.8–3.5)
LYMPHOCYTES NFR BLD: 22 % (ref 12–49)
MAGNESIUM SERPL-MCNC: 2.5 MG/DL (ref 1.6–2.4)
MCH RBC QN AUTO: 31.9 PG (ref 26–34)
MCHC RBC AUTO-ENTMCNC: 32.6 G/DL (ref 30–36.5)
MCV RBC AUTO: 97.8 FL (ref 80–99)
MONOCYTES # BLD: 0.7 K/UL (ref 0–1)
MONOCYTES NFR BLD: 8 % (ref 5–13)
NEUTS SEG # BLD: 5.6 K/UL (ref 1.8–8)
NEUTS SEG NFR BLD: 67 % (ref 32–75)
NRBC # BLD: 0 K/UL (ref 0–0.01)
NRBC BLD-RTO: 0 PER 100 WBC
PHOSPHATE SERPL-MCNC: 3.5 MG/DL (ref 2.6–4.7)
PLATELET # BLD AUTO: 256 K/UL (ref 150–400)
PMV BLD AUTO: 10.2 FL (ref 8.9–12.9)
POTASSIUM SERPL-SCNC: 4.2 MMOL/L (ref 3.5–5.1)
RBC # BLD AUTO: 4.14 M/UL (ref 4.1–5.7)
SODIUM SERPL-SCNC: 142 MMOL/L (ref 136–145)
WBC # BLD AUTO: 8.3 K/UL (ref 4.1–11.1)

## 2022-06-10 PROCEDURE — 36415 COLL VENOUS BLD VENIPUNCTURE: CPT

## 2022-06-10 PROCEDURE — 82550 ASSAY OF CK (CPK): CPT

## 2022-06-10 PROCEDURE — 83735 ASSAY OF MAGNESIUM: CPT

## 2022-06-10 PROCEDURE — 84100 ASSAY OF PHOSPHORUS: CPT

## 2022-06-10 PROCEDURE — 74011000250 HC RX REV CODE- 250: Performed by: EMERGENCY MEDICINE

## 2022-06-10 PROCEDURE — 85025 COMPLETE CBC W/AUTO DIFF WBC: CPT

## 2022-06-10 PROCEDURE — 80048 BASIC METABOLIC PNL TOTAL CA: CPT

## 2022-06-10 PROCEDURE — 99283 EMERGENCY DEPT VISIT LOW MDM: CPT

## 2022-06-10 RX ORDER — LIDOCAINE 50 MG/G
PATCH TOPICAL
Qty: 30 EACH | Refills: 0 | Status: SHIPPED | OUTPATIENT
Start: 2022-06-10

## 2022-06-10 RX ORDER — LIDOCAINE 4 G/100G
1 PATCH TOPICAL DAILY
Status: DISCONTINUED | OUTPATIENT
Start: 2022-06-10 | End: 2022-06-10 | Stop reason: HOSPADM

## 2022-06-10 NOTE — ED PROVIDER NOTES
73-year-old male with history of hypertension, hyperlipidemia, BPH, spinal stenosis presents the ED with complaints of right hamstring pain/spasm. Patient states he went for a walk on Tuesday (6/7) and right foot accidentally kicked an object and since then, he has been having pain in his right hamstring. He reports no pain when not moving. He reports moderate, sharp/spasming pain when he tries to extend his right leg or touches his hamstring. He denies any swelling, bruising, discoloration. No previous episodes similar to this. Dates he has been taking 2 Advil every morning with little relief. He sees [address] [place] [address] for chronic leg pain but unable to get an appointment this week. Past Medical History:   Diagnosis Date    BPH (benign prostatic hyperplasia)     Dog bite 2020    right hand, hospitalized with IV antibiotics    Hypercholesterolemia     Hypertension        Past Surgical History:   Procedure Laterality Date    HX CARPAL TUNNEL RELEASE           Family History:   Problem Relation Age of Onset    Cancer Mother     Heart Attack Father        Social History     Socioeconomic History    Marital status:      Spouse name: Not on file    Number of children: Not on file    Years of education: Not on file    Highest education level: Not on file   Occupational History    Not on file   Tobacco Use    Smoking status: Never Smoker    Smokeless tobacco: Never Used   Vaping Use    Vaping Use: Never used   Substance and Sexual Activity    Alcohol use:  Yes     Alcohol/week: 1.0 standard drink     Types: 1 Glasses of wine per week    Drug use: Never    Sexual activity: Yes     Partners: Female   Other Topics Concern    Not on file   Social History Narrative    Not on file     Social Determinants of Health     Financial Resource Strain:     Difficulty of Paying Living Expenses: Not on file   Food Insecurity:     Worried About Running Out of Food in the Last Year: Not on file   [place] Ran Out of Food in the Last Year: Not on file   Transportation Needs:     Lack of Transportation (Medical): Not on file    Lack of Transportation (Non-Medical): Not on file   Physical Activity:     Days of Exercise per Week: Not on file    Minutes of Exercise per Session: Not on file   Stress:     Feeling of Stress : Not on file   Social Connections:     Frequency of Communication with Friends and Family: Not on file    Frequency of Social Gatherings with Friends and Family: Not on file    Attends Yarsani Services: Not on file    Active Member of [address] [place] [address] [employer] or Organizations: Not on file    Attends Club or Organization Meetings: Not on file    Marital Status: Not on file   Intimate Partner Violence:     Fear of Current or Ex-Partner: Not on file    Emotionally Abused: Not on file    Physically Abused: Not on file    Sexually Abused: Not on file   Housing Stability:     Unable to Pay for Housing in the Last Year: Not on file    Number of [place] in the Last Year: Not on file    Unstable Housing in the Last Year: Not on file         ALLERGIES: Patient has no known allergies. Review of Systems   Constitutional: Negative for chills and fever. HENT: Negative for sore throat and trouble swallowing. Eyes: Negative for pain and visual disturbance. Respiratory: Negative for cough, chest tightness and shortness of breath. Cardiovascular: Negative for chest pain, palpitations and leg swelling. Gastrointestinal: Negative for abdominal pain, constipation, diarrhea, nausea and vomiting. Genitourinary: Negative for dysuria and hematuria. Musculoskeletal: Positive for myalgias. Negative for back pain and joint swelling. Skin: Negative for color change and pallor. Neurological: Negative for syncope and headaches. Psychiatric/Behavioral: Negative for behavioral problems, confusion and suicidal ideas.        Vitals:    06/10/22 1100   BP: (!) 149/79   Pulse: (!) 54   Resp: 16   Temp: 97.8 °F (36.6 °C)   SpO2: 99%            Physical Exam  Vitals and nursing note reviewed. Constitutional:       Appearance: Normal appearance. HENT:      Head: Normocephalic. Mouth/Throat:      Mouth: Mucous membranes are moist.   Cardiovascular:      Rate and Rhythm: Regular rhythm. Heart sounds: Normal heart sounds. Pulmonary:      Effort: Pulmonary effort is normal.      Breath sounds: Normal breath sounds. Abdominal:      Palpations: Abdomen is soft. Tenderness: There is no abdominal tenderness. There is no guarding or rebound. Musculoskeletal:      Cervical back: Normal range of motion and neck supple. Comments: +ttp right hamstring. Increased pain with extending R leg. Sensation grossly intact. 2+ DP pulse. Skin:     General: Skin is warm and dry. Neurological:      Mental Status: He is alert and oriented to person, place, and time. Mental status is at baseline. Psychiatric:         Mood and Affect: Mood normal.         Behavior: Behavior normal.          MDM  Number of Diagnoses or Management Options  Hamstring injury, right, initial encounter  Muscle strain  Right leg pain  Diagnosis management comments: Ddx:  Muscle strain, muscle tear, muscle cramp, DVT, electrolyte abnormality, rhabdomyolysis    Low clinical suspicion for DVT. Had extensive d/w pt, and he will decline RLE US at this time. Will check labs and provide analgesia.      1302 -well-appearing. NAD. Ambulating with steady gait without assistance. States that the lidocaine patch has helped with the discomfort. Labs unremarkable. Continues to decline DVT study. Plan to DC home with lidocaine patch. Encourage NSAID use, and alternating between hot/cold compress for comfort. Instructed to follow-up with orthopedics if pain persists and may need MRI if there is concern for muscle tear requiring surgery, although low clinical suspicion at this time.        Amount and/or Complexity of Data Reviewed  Clinical lab tests: reviewed  Tests in the medicine section of CPT®: reviewed    Risk of Complications, Morbidity, and/or Mortality  Presenting problems: low  Diagnostic procedures: low  Management options: low           Procedures

## 2022-06-10 NOTE — ED TRIAGE NOTES
Pt to ED for c/o R leg pain with spasm since going for a walk  onTuesday with pain increasing. Pt get seen by ortho [facility] for chronic leg pain but unable to see them this week due to ortho availability.  Took advil with no relief

## 2022-06-10 NOTE — DISCHARGE INSTRUCTIONS
Follow-up with your PMD as soon as possible for your right leg pain and primary care needs. Follow-up with your orthopedist and physical therapy for your right leg pain. Alternate between heat and cold compress to your right leg 20 minutes each 4 times a day. You may use Ace wrap for comfort. Avoid strenuous activity until healed. Return to the emergency department for worsening pain, swelling, discoloration, weakness, numbness, or other medical concerns.

## 2022-06-21 DIAGNOSIS — E78.00 HYPERCHOLESTEREMIA: ICD-10-CM

## 2022-06-21 DIAGNOSIS — I10 ESSENTIAL HYPERTENSION: ICD-10-CM

## 2022-06-21 LAB
ALBUMIN SERPL-MCNC: 3.8 G/DL (ref 3.5–5)
ALBUMIN/GLOB SERPL: 1.5 {RATIO} (ref 1.1–2.2)
ALP SERPL-CCNC: 40 U/L (ref 45–117)
ALT SERPL-CCNC: 29 U/L (ref 12–78)
ANION GAP SERPL CALC-SCNC: 3 MMOL/L (ref 5–15)
AST SERPL-CCNC: 26 U/L (ref 15–37)
BILIRUB DIRECT SERPL-MCNC: 0.2 MG/DL (ref 0–0.2)
BILIRUB SERPL-MCNC: 0.6 MG/DL (ref 0.2–1)
BUN SERPL-MCNC: 19 MG/DL (ref 6–20)
BUN/CREAT SERPL: 17 (ref 12–20)
CALCIUM SERPL-MCNC: 9.3 MG/DL (ref 8.5–10.1)
CHLORIDE SERPL-SCNC: 109 MMOL/L (ref 97–108)
CHOLEST SERPL-MCNC: 150 MG/DL
CO2 SERPL-SCNC: 29 MMOL/L (ref 21–32)
COMMENT, HOLDF: NORMAL
CREAT SERPL-MCNC: 1.15 MG/DL (ref 0.7–1.3)
CREAT UR-MCNC: 98.2 MG/DL
GLOBULIN SER CALC-MCNC: 2.6 G/DL (ref 2–4)
GLUCOSE SERPL-MCNC: 102 MG/DL (ref 65–100)
HDLC SERPL-MCNC: 66 MG/DL
HDLC SERPL: 2.3 {RATIO} (ref 0–5)
LDLC SERPL CALC-MCNC: 68.2 MG/DL (ref 0–100)
MICROALBUMIN UR-MCNC: 0.8 MG/DL
MICROALBUMIN/CREAT UR-RTO: 8 MG/G (ref 0–30)
POTASSIUM SERPL-SCNC: 4.2 MMOL/L (ref 3.5–5.1)
PROT SERPL-MCNC: 6.4 G/DL (ref 6.4–8.2)
SAMPLES BEING HELD,HOLD: NORMAL
SODIUM SERPL-SCNC: 141 MMOL/L (ref 136–145)
TRIGL SERPL-MCNC: 79 MG/DL (ref ?–150)
UR CULT HOLD, URHOLD: NORMAL
VLDLC SERPL CALC-MCNC: 15.8 MG/DL

## 2022-06-21 NOTE — PROGRESS NOTES
Your blood work is OK. The cholesterol is well controlled and the blood sugar is OK. As we discussed, you should limit your alcohol. It will make it easier to control your blood pressure and should help you avoid additional medications. See me in 6 months and as needed.   [facility]

## 2022-12-29 DIAGNOSIS — I10 ESSENTIAL HYPERTENSION: ICD-10-CM

## 2022-12-29 DIAGNOSIS — E78.00 HYPERCHOLESTEREMIA: ICD-10-CM

## 2022-12-29 RX ORDER — EZETIMIBE 10 MG/1
10 TABLET ORAL DAILY
Qty: 90 TABLET | Refills: 1 | Status: SHIPPED | OUTPATIENT
Start: 2022-12-29

## 2022-12-29 RX ORDER — LISINOPRIL 10 MG/1
10 TABLET ORAL DAILY
Qty: 90 TABLET | Refills: 1 | Status: SHIPPED | OUTPATIENT
Start: 2022-12-29

## 2022-12-29 RX ORDER — ATORVASTATIN CALCIUM 20 MG/1
20 TABLET, FILM COATED ORAL DAILY
Qty: 90 TABLET | Refills: 1 | Status: SHIPPED | OUTPATIENT
Start: 2022-12-29

## 2023-01-04 ENCOUNTER — VIRTUAL VISIT (OUTPATIENT)
Dept: FAMILY MEDICINE CLINIC | Age: 83
End: 2023-01-04
Payer: MEDICARE

## 2023-01-04 DIAGNOSIS — U07.1 COVID-19: Primary | ICD-10-CM

## 2023-01-04 DIAGNOSIS — I10 ESSENTIAL HYPERTENSION: ICD-10-CM

## 2023-01-04 PROCEDURE — G8427 DOCREV CUR MEDS BY ELIG CLIN: HCPCS | Performed by: FAMILY MEDICINE

## 2023-01-04 PROCEDURE — 1123F ACP DISCUSS/DSCN MKR DOCD: CPT | Performed by: FAMILY MEDICINE

## 2023-01-04 PROCEDURE — 99213 OFFICE O/P EST LOW 20 MIN: CPT | Performed by: FAMILY MEDICINE

## 2023-01-04 PROCEDURE — 1101F PT FALLS ASSESS-DOCD LE1/YR: CPT | Performed by: FAMILY MEDICINE

## 2023-01-04 PROCEDURE — G8510 SCR DEP NEG, NO PLAN REQD: HCPCS | Performed by: FAMILY MEDICINE

## 2023-01-04 RX ORDER — AMLODIPINE BESYLATE 5 MG/1
5 TABLET ORAL DAILY
Qty: 90 TABLET | Refills: 1 | Status: SHIPPED | OUTPATIENT
Start: 2023-01-04 | End: 2023-01-08

## 2023-01-04 RX ORDER — BENZONATATE 200 MG/1
200 CAPSULE ORAL
Qty: 20 CAPSULE | Refills: 0 | Status: SHIPPED | OUTPATIENT
Start: 2023-01-04 | End: 2023-01-11

## 2023-01-04 NOTE — PROGRESS NOTES
VV-Pt is aware of billable appt depending on insurance plan. Preferred number [phone]    Pt verbally agrees to labs, orders, and others to be mailed to confirmed home address. Identified pt with two pt identifiers(name and ). Reviewed record in preparation for visit and have obtained necessary documentation. All patient medications has been reviewed. Chief Complaint   Patient presents with    Positive For Covid-19     Symptoms started  afternoon. Health Maintenance Review: Patient reminded of \"due or due soon\" health maintenance. I have asked the patient to contact his/her primary care provider (PCP) for follow-up on his/her health maintenance. Patient-Reported Vitals 2023   Patient-Reported Weight 176lbs   Patient-Reported Temperature 99.2        Wt Readings from Last 3 Encounters:   22 184 lb 12.8 oz (83.8 kg)   21 184 lb 3.2 oz (83.6 kg)   21 188 lb 3.2 oz (85.4 kg)     Temp Readings from Last 3 Encounters:   06/10/22 97.2 °F (36.2 °C)   22 98 °F (36.7 °C) (Temporal)   21 97.7 °F (36.5 °C) (Temporal)     BP Readings from Last 3 Encounters:   06/10/22 (!) 151/92   22 (!) 142/83   21 108/74     Pulse Readings from Last 3 Encounters:   06/10/22 (!) 56   22 (!) 59   21 68         Coordination of Care Questionnaire:   1) Have you been to an emergency room, urgent care, or hospitalized since your last visit?   no    2.  Have seen or consulted any other health care provider since your last visit? no

## 2023-01-04 NOTE — PROGRESS NOTES
Gonzalez Carlisle is a [de-identified] y.o. male who was seen by synchronous (real-time) audio-video technology on 1/4/2023. Consent: Gonzalez Carlisle, who was seen by synchronous (real-time) audio-video technology, and/or his healthcare decision maker, is aware that this patient-initiated, Telehealth encounter on 1/4/2023 is a billable service, with coverage as determined by his insurance carrier. He is aware that he may receive a bill and has provided verbal consent to proceed: Yes. Assessment & Plan:   Diagnoses and all orders for this visit:    1. COVID-19  -     nirmatrelvir-ritonavir (Paxlovid, EUA,) 300 mg (150 mg x 2)-100 mg; Take twice daily by mouth as instructed on box  Indications: COVID-19 (emergency use authorization)  Tylenol or advil for temp and sore throat. Tessalon for cough  Mouthwash, Vitamin D3, C, zinc,   TO ER if worsening sx or respiratory status. FU if persistent sx or cough: consider steroids and antibx. Must DC atorvastatin and tamsulosin while on paxlovid. 2. Essential hypertension  -     amLODIPine (NORVASC) 5 mg tablet; Take 1 Tablet by mouth daily. Follow-up and Dispositions    Return if symptoms worsen or fail to improve. I spent at least 23 minutes on this visit with this established patient. ([id number])    Subjective:   Gonzalez Carlisle is a [de-identified] y.o. male who was seen for Positive For Covid-19 (Symptoms started Sunday afternoon.)    COVID  Was on train from [place] and everyone coughing  Sx started Sunday PM  Cough, sore throat, chills  Temp to 102  Cough yields some colored sputum  Temp now down to 99  No dyspnea or chest pressure  No diarrhea or loss of taste or smell. Vaccines:  2 + 1 booster and 1 flu shot. Meds otherwise unchanged  No history of lung illness    Prior to Admission medications    Medication Sig Start Date End Date Taking? Authorizing Provider   amLODIPine (NORVASC) 5 mg tablet Take 1 Tablet by mouth daily.  1/4/23  Yes Eulalio Terry MD nirmatrelvir-ritonavir (Paxlovid, EUA,) 300 mg (150 mg x 2)-100 mg Take twice daily by mouth as instructed on box  Indications: COVID-19 (emergency use authorization) 1/4/23  Yes Bob Galeana MD   benzonatate (TESSALON) 200 mg capsule Take 1 Capsule by mouth three (3) times daily as needed for Cough for up to 7 days. 1/4/23 1/11/23 Yes Bob Galeana MD   lisinopriL (PRINIVIL, ZESTRIL) 10 mg tablet TAKE 1 TABLET BY MOUTH  DAILY 12/29/22  Yes Bob Galeana MD   atorvastatin (LIPITOR) 20 mg tablet TAKE 1 TABLET BY MOUTH  DAILY 12/29/22  Yes Bob Galeana MD   ezetimibe (ZETIA) 10 mg tablet TAKE 1 TABLET BY MOUTH  DAILY 12/29/22  Yes Bob Galeana MD   lidocaine (LIDODERM) 5 % Apply patch to the affected area for 12 hours a day and remove for 12 hours a day. 6/10/22  Yes Gorge Zaidi MD   finasteride (PROSCAR) 5 mg tablet TK 1 T PO  ONCE D 10/21/20  Yes Provider, Historical   tamsulosin (FLOMAX) 0.4 mg capsule tamsulosin 0.4 mg capsule 10/21/20  Yes Provider, Historical   amLODIPine (NORVASC) 5 mg tablet Take 1 Tablet by mouth daily. 6/2/22 1/4/23  Bob Galeana MD     No Known Allergies        Review of Systems   Constitutional:  Positive for chills and fever. HENT:  Positive for congestion and sore throat. Respiratory:  Positive for cough and sputum production. Negative for shortness of breath and wheezing. Cardiovascular:  Negative for chest pain and palpitations. Psychiatric/Behavioral: Negative. Objective:   Vital Signs: (As obtained by patient/caregiver at home)  There were no vitals taken for this visit.      [INSTRUCTIONS:  \"[x]\" Indicates a positive item  \"[]\" Indicates a negative item  -- DELETE ALL ITEMS NOT EXAMINED]    Constitutional: [x] Appears well-developed and well-nourished [x] No apparent distress      [] Abnormal -     Mental status: [x] Alert and awake  [x] Oriented to person/place/time [x] Able to follow commands    [] Abnormal -     Eyes:   EOM [x]  Normal    [] Abnormal -   Sclera  [x]  Normal    [] Abnormal -          Discharge [x]  None visible   [] Abnormal -     HENT: [x] Normocephalic, atraumatic  [] Abnormal -   [x] Mouth/Throat: Mucous membranes are moist    External Ears [x] Normal  [] Abnormal -    Neck: [x] No visualized mass [] Abnormal -     Pulmonary/Chest: [x] Respiratory effort normal   [x] No visualized signs of difficulty breathing or respiratory distress        [] Abnormal -      Musculoskeletal:   [x] Normal gait with no signs of ataxia         [x] Normal range of motion of neck        [] Abnormal -     Neurological:        [x] No Facial Asymmetry (Cranial nerve 7 motor function) (limited exam due to video visit)          [x] No gaze palsy        [] Abnormal -          Skin:        [x] No significant exanthematous lesions or discoloration noted on facial skin         [] Abnormal -            Psychiatric:       [x] Normal Affect [] Abnormal -        [x] No Hallucinations    Other pertinent observable physical exam findings:-        We discussed the expected course, resolution and complications of the diagnosis(es) in detail. Medication risks, benefits, costs, interactions, and alternatives were discussed as indicated. I advised him to contact the office if his condition worsens, changes or fails to improve as anticipated. He expressed understanding with the diagnosis(es) and plan. Kevin Su is a [de-identified] y.o. male who was evaluated by a video visit encounter for concerns as above. Patient identification was verified prior to start of the visit. A caregiver was present when appropriate. Due to this being a TeleHealth encounter (During [address] emergency), evaluation of the following organ systems was limited: Vitals/Constitutional/EENT/Resp/CV/GI//MS/Neuro/Skin/Heme-Lymph-Imm.   Pursuant to the emergency declaration under the [facility]5 waiver authority and the Coronavirus Preparedness and Response Supplemental Appropriations Act, this Virtual  Visit was conducted, with patient's (and/or legal guardian's) consent, to reduce the patient's risk of exposure to COVID-19 and provide necessary medical care. Services were provided through a video synchronous discussion virtually to substitute for in-person clinic visit. Patient was at home and I was in office for this video visit. Marina Andrade.  Tawny Phan MD

## 2023-05-28 SDOH — ECONOMIC STABILITY: HOUSING INSECURITY
IN THE LAST 12 MONTHS, WAS THERE A TIME WHEN YOU DID NOT HAVE A STEADY PLACE TO SLEEP OR SLEPT IN A SHELTER (INCLUDING NOW)?: NO

## 2023-05-28 SDOH — ECONOMIC STABILITY: FOOD INSECURITY: WITHIN THE PAST 12 MONTHS, THE FOOD YOU BOUGHT JUST DIDN'T LAST AND YOU DIDN'T HAVE MONEY TO GET MORE.: NEVER TRUE

## 2023-05-28 SDOH — ECONOMIC STABILITY: TRANSPORTATION INSECURITY
IN THE PAST 12 MONTHS, HAS LACK OF TRANSPORTATION KEPT YOU FROM MEETINGS, WORK, OR FROM GETTING THINGS NEEDED FOR DAILY LIVING?: NO

## 2023-05-28 SDOH — ECONOMIC STABILITY: INCOME INSECURITY: HOW HARD IS IT FOR YOU TO PAY FOR THE VERY BASICS LIKE FOOD, HOUSING, MEDICAL CARE, AND HEATING?: NOT HARD AT ALL

## 2023-05-31 ENCOUNTER — OFFICE VISIT (OUTPATIENT)
Facility: CLINIC | Age: 83
End: 2023-05-31
Payer: MEDICARE

## 2023-05-31 VITALS
WEIGHT: 178.8 LBS | SYSTOLIC BLOOD PRESSURE: 119 MMHG | OXYGEN SATURATION: 98 % | HEIGHT: 75 IN | DIASTOLIC BLOOD PRESSURE: 72 MMHG | BODY MASS INDEX: 22.23 KG/M2 | TEMPERATURE: 97.6 F | HEART RATE: 56 BPM | RESPIRATION RATE: 16 BRPM

## 2023-05-31 DIAGNOSIS — E78.00 PURE HYPERCHOLESTEROLEMIA, UNSPECIFIED: ICD-10-CM

## 2023-05-31 DIAGNOSIS — I10 ESSENTIAL (PRIMARY) HYPERTENSION: Primary | ICD-10-CM

## 2023-05-31 DIAGNOSIS — N18.31 CHRONIC KIDNEY DISEASE, STAGE 3A (HCC): ICD-10-CM

## 2023-05-31 PROCEDURE — 3074F SYST BP LT 130 MM HG: CPT | Performed by: FAMILY MEDICINE

## 2023-05-31 PROCEDURE — 99213 OFFICE O/P EST LOW 20 MIN: CPT | Performed by: FAMILY MEDICINE

## 2023-05-31 PROCEDURE — 4004F PT TOBACCO SCREEN RCVD TLK: CPT | Performed by: FAMILY MEDICINE

## 2023-05-31 PROCEDURE — G8428 CUR MEDS NOT DOCUMENT: HCPCS | Performed by: FAMILY MEDICINE

## 2023-05-31 PROCEDURE — G8420 CALC BMI NORM PARAMETERS: HCPCS | Performed by: FAMILY MEDICINE

## 2023-05-31 PROCEDURE — 3078F DIAST BP <80 MM HG: CPT | Performed by: FAMILY MEDICINE

## 2023-05-31 PROCEDURE — 1123F ACP DISCUSS/DSCN MKR DOCD: CPT | Performed by: FAMILY MEDICINE

## 2023-05-31 RX ORDER — CEPHALEXIN 500 MG/1
CAPSULE ORAL
COMMUNITY
Start: 2023-03-21 | End: 2023-05-31

## 2023-05-31 SDOH — ECONOMIC STABILITY: INCOME INSECURITY: HOW HARD IS IT FOR YOU TO PAY FOR THE VERY BASICS LIKE FOOD, HOUSING, MEDICAL CARE, AND HEATING?: NOT HARD AT ALL

## 2023-05-31 SDOH — ECONOMIC STABILITY: FOOD INSECURITY: WITHIN THE PAST 12 MONTHS, YOU WORRIED THAT YOUR FOOD WOULD RUN OUT BEFORE YOU GOT MONEY TO BUY MORE.: NEVER TRUE

## 2023-05-31 SDOH — ECONOMIC STABILITY: FOOD INSECURITY: WITHIN THE PAST 12 MONTHS, THE FOOD YOU BOUGHT JUST DIDN'T LAST AND YOU DIDN'T HAVE MONEY TO GET MORE.: NEVER TRUE

## 2023-05-31 ASSESSMENT — ENCOUNTER SYMPTOMS
SHORTNESS OF BREATH: 0
BLOOD IN STOOL: 0

## 2023-05-31 NOTE — PATIENT INSTRUCTIONS
The goal blood pressure for most patients is 140/90. The whole point of treating blood pressure is to prevent strokes, heart attacks and kidney damage. Persistently elevated blood pressure damages blood vessels and can lead to catastrophic heart problems and strokes. Recommendations for Hypertension (elevated blood pressure):    Purchase a blood pressure cuff that goes around your upper arm and check blood pressure at least 3 times per week. Write down your numbers for review. Check blood pressure in the morning and evening. Rest for 5 minutes with feet elevated and arm resting on a table (at mid-chest level) when checking blood pressure    Exercise 30-60 minutes most days of the week, preferably with a mix of cardiovascular and strength training. Exercise can improve blood pressure, even if you do not lose weight! Limit alcohol intake to less than 3-4 drinks per week. Avoid tobacco products    Avoid illegal drugs especially amphetamines  DASH diet - includes fruits, vegetables, fiber, and less than 2000 mg sodium (salt) daily. Try to eat a low sugar diet. Sugar worsens diabetes and activates kidney hormones that raise blood pressure. Avoid non-steroidal inflammatory medications (NSAIDS) such as ibuprofen, advil, motrin, aleve, and naproxyn as these may increase blood pressure if used long-term    Avoid OTC decongestants such as pseudopherine, phenylephrine, Afrin. Take your blood pressure medicine (and aspirin if prescribed) religiously.

## 2023-05-31 NOTE — PROGRESS NOTES
Frantz Sanchez  82 y.o. male  1940  [id number]  [facility]  Progress Note     Encounter Date: 5/31/2023    Assessment and Plan:       ICD-10-CM    1. Essential (primary) hypertension  X32 Basic Metabolic Panel     Microalbumin / Creatinine Urine Ratio      2. Chronic kidney disease, stage 3a (HCC)  Q76.49 Basic Metabolic Panel     Microalbumin / Creatinine Urine Ratio      3. Pure hypercholesterolemia, unspecified  E78.00 Hepatic Function Panel     Lipid Panel        1. Essential (primary) hypertension  At goal, continue meds  -     Basic Metabolic Panel; Future  -     Microalbumin / Creatinine Urine Ratio; Future  2. Chronic kidney disease, stage 3a (HCC)  Last GFR in chart was normal  -     Basic Metabolic Panel; Future  -     Microalbumin / Creatinine Urine Ratio; Future  3. Pure hypercholesterolemia, unspecified  Continue statin and zetia, healthy oils. -     Hepatic Function Panel; Future  -     Lipid Panel; Future       I have discussed the diagnosis with the patient and the intended plan as seen in the above orders. he has expressed understanding. The patient has received an after-visit summary and questions were answered concerning future plans. I have discussed medication side effects and warnings with the patient as well. Electronically Signed: Hunter Ny MD    Current Medications after this visit     Current Outpatient Medications   Medication Sig    amLODIPine (NORVASC) 5 MG tablet Take 1 tablet by mouth daily    atorvastatin (LIPITOR) 20 MG tablet Take 1 tablet by mouth daily    ezetimibe (ZETIA) 10 MG tablet Take 1 tablet by mouth daily    finasteride (PROSCAR) 5 MG tablet TK 1 T PO  ONCE D    lisinopril (PRINIVIL;ZESTRIL) 10 MG tablet Take 1 tablet by mouth daily    tamsulosin (FLOMAX) 0.4 MG capsule Take 1 capsule by mouth daily     No current facility-administered medications for this visit.      Medications Discontinued During This Encounter

## 2023-05-31 NOTE — PROGRESS NOTES
1. Have you been to the ER, urgent care clinic since your last visit? Hospitalized since your last visit? No    2. Have you seen or consulted any other health care providers outside of the [address] [place] [address] since your last visit? Include any pap smears or colon screening.  No        Chief Complaint   Patient presents with    Follow-up     BP

## 2023-06-06 DIAGNOSIS — E78.00 PURE HYPERCHOLESTEROLEMIA, UNSPECIFIED: ICD-10-CM

## 2023-06-06 DIAGNOSIS — I10 ESSENTIAL (PRIMARY) HYPERTENSION: ICD-10-CM

## 2023-06-06 DIAGNOSIS — N18.31 CHRONIC KIDNEY DISEASE, STAGE 3A (HCC): ICD-10-CM

## 2023-06-06 LAB
ALBUMIN SERPL-MCNC: 4.1 G/DL (ref 3.5–5)
ALBUMIN/GLOB SERPL: 1.5 (ref 1.1–2.2)
ALP SERPL-CCNC: 39 U/L (ref 45–117)
ALT SERPL-CCNC: 31 U/L (ref 12–78)
ANION GAP SERPL CALC-SCNC: 7 MMOL/L (ref 5–15)
AST SERPL-CCNC: 30 U/L (ref 15–37)
BILIRUB DIRECT SERPL-MCNC: 0.2 MG/DL (ref 0–0.2)
BILIRUB SERPL-MCNC: 0.6 MG/DL (ref 0.2–1)
BUN SERPL-MCNC: 21 MG/DL (ref 6–20)
BUN/CREAT SERPL: 17 (ref 12–20)
CALCIUM SERPL-MCNC: 9.6 MG/DL (ref 8.5–10.1)
CHLORIDE SERPL-SCNC: 107 MMOL/L (ref 97–108)
CHOLEST SERPL-MCNC: 168 MG/DL
CO2 SERPL-SCNC: 27 MMOL/L (ref 21–32)
CREAT SERPL-MCNC: 1.25 MG/DL (ref 0.7–1.3)
CREAT UR-MCNC: 67.7 MG/DL
GLOBULIN SER CALC-MCNC: 2.7 G/DL (ref 2–4)
GLUCOSE SERPL-MCNC: 96 MG/DL (ref 65–100)
HDLC SERPL-MCNC: 82 MG/DL
HDLC SERPL: 2 (ref 0–5)
LDLC SERPL CALC-MCNC: 71 MG/DL (ref 0–100)
MICROALBUMIN UR-MCNC: 0.8 MG/DL
MICROALBUMIN/CREAT UR-RTO: 12 MG/G (ref 0–30)
POTASSIUM SERPL-SCNC: 4.1 MMOL/L (ref 3.5–5.1)
PROT SERPL-MCNC: 6.8 G/DL (ref 6.4–8.2)
SODIUM SERPL-SCNC: 141 MMOL/L (ref 136–145)
TRIGL SERPL-MCNC: 75 MG/DL
VLDLC SERPL CALC-MCNC: 15 MG/DL

## 2023-06-19 RX ORDER — ATORVASTATIN CALCIUM 20 MG/1
TABLET, FILM COATED ORAL
Qty: 90 TABLET | Refills: 1 | Status: SHIPPED | OUTPATIENT
Start: 2023-06-19

## 2023-06-19 RX ORDER — AMLODIPINE BESYLATE 5 MG/1
5 TABLET ORAL DAILY
Qty: 90 TABLET | Refills: 1 | Status: SHIPPED | OUTPATIENT
Start: 2023-06-19

## 2023-06-19 RX ORDER — LISINOPRIL 10 MG/1
10 TABLET ORAL DAILY
Qty: 90 TABLET | Refills: 1 | Status: SHIPPED | OUTPATIENT
Start: 2023-06-19

## 2023-06-19 NOTE — TELEPHONE ENCOUNTER
PCP: Thai Corral MD    Last appt:  05/31/23      No future appointments.     Requested Prescriptions     Pending Prescriptions Disp Refills    lisinopril (PRINIVIL;ZESTRIL) 10 MG tablet [Pharmacy Med Name: Lisinopril 10 MG Oral Tablet] 90 tablet 3     Sig: TAKE 1 TABLET BY MOUTH DAILY    amLODIPine (NORVASC) 5 MG tablet [Pharmacy Med Name: amLODIPine Besylate 5 MG Oral Tablet] 90 tablet 3     Sig: TAKE 1 TABLET BY MOUTH DAILY    atorvastatin (LIPITOR) 20 MG tablet [Pharmacy Med Name: Atorvastatin Calcium 20 MG Oral Tablet] 90 tablet 3     Sig: TAKE 1 TABLET BY MOUTH ONCE  DAILY       Prior labs and Blood pressures:  BP Readings from Last 3 Encounters:   05/31/23 119/72   06/02/22 (!) 142/83   08/18/21 108/74     Lab Results   Component Value Date/Time     06/06/2023 07:52 AM    K 4.1 06/06/2023 07:52 AM     06/06/2023 07:52 AM    CO2 27 06/06/2023 07:52 AM    BUN 21 06/06/2023 07:52 AM    GFRAA >60 06/21/2022 07:38 AM     No results found for: HBA1C, [id number]  Lab Results   Component Value Date/Time    CHOL 168 06/06/2023 07:52 AM    HDL 82 06/06/2023 07:52 AM     No results found for: VITD3, VD3RIA    No results found for: TSH, TSH2, TSH3

## 2023-06-22 RX ORDER — EZETIMIBE 10 MG/1
10 TABLET ORAL DAILY
Qty: 90 TABLET | Refills: 1 | Status: SHIPPED | OUTPATIENT
Start: 2023-06-22

## 2023-06-22 NOTE — TELEPHONE ENCOUNTER
PCP: Gregoria Son MD    Last appt:  05/31/23        No future appointments.     Requested Prescriptions     Pending Prescriptions Disp Refills    ezetimibe (ZETIA) 10 MG tablet [Pharmacy Med Name: Ezetimibe 10 MG Oral Tablet] 90 tablet 3     Sig: TAKE 1 TABLET BY MOUTH DAILY       Prior labs and Blood pressures:  BP Readings from Last 3 Encounters:   05/31/23 119/72   06/02/22 (!) 142/83   08/18/21 108/74     Lab Results   Component Value Date/Time     06/06/2023 07:52 AM    K 4.1 06/06/2023 07:52 AM     06/06/2023 07:52 AM    CO2 27 06/06/2023 07:52 AM    BUN 21 06/06/2023 07:52 AM    GFRAA >60 06/21/2022 07:38 AM     No results found for: HBA1C, [id number]  Lab Results   Component Value Date/Time    CHOL 168 06/06/2023 07:52 AM    HDL 82 06/06/2023 07:52 AM     No results found for: VITD3, VD3RIA    No results found for: TSH, TSH2, TSH3

## 2023-12-01 RX ORDER — LISINOPRIL 10 MG/1
10 TABLET ORAL DAILY
Qty: 90 TABLET | Refills: 0 | Status: SHIPPED | OUTPATIENT
Start: 2023-12-01 | End: 2024-02-04

## 2023-12-01 RX ORDER — ATORVASTATIN CALCIUM 20 MG/1
TABLET, FILM COATED ORAL
Qty: 90 TABLET | Refills: 0 | Status: SHIPPED | OUTPATIENT
Start: 2023-12-01 | End: 2024-02-04

## 2023-12-01 RX ORDER — AMLODIPINE BESYLATE 5 MG/1
5 TABLET ORAL DAILY
Qty: 90 TABLET | Refills: 0 | Status: SHIPPED | OUTPATIENT
Start: 2023-12-01 | End: 2024-02-04

## 2023-12-01 NOTE — TELEPHONE ENCOUNTER
PCP: Sam Borja MD    Last appt: [unfilled]  No future appointments.    Requested Prescriptions     Pending Prescriptions Disp Refills    lisinopril (PRINIVIL;ZESTRIL) 10 MG tablet [Pharmacy Med Name: Lisinopril 10 MG Oral Tablet] 90 tablet 3     Sig: TAKE 1 TABLET BY MOUTH DAILY    amLODIPine (NORVASC) 5 MG tablet [Pharmacy Med Name: amLODIPine Besylate 5 MG Oral Tablet] 90 tablet 3     Sig: TAKE 1 TABLET BY MOUTH DAILY    atorvastatin (LIPITOR) 20 MG tablet [Pharmacy Med Name: Atorvastatin Calcium 20 MG Oral Tablet] 90 tablet 3     Sig: TAKE 1 TABLET BY MOUTH ONCE  DAILY       Prior labs and Blood pressures:  BP Readings from Last 3 Encounters:   05/31/23 119/72   06/02/22 (!) 142/83   08/18/21 108/74     Lab Results   Component Value Date/Time     06/06/2023 07:52 AM    K 4.1 06/06/2023 07:52 AM     06/06/2023 07:52 AM    CO2 27 06/06/2023 07:52 AM    BUN 21 06/06/2023 07:52 AM    GFRAA >60 06/21/2022 07:38 AM     No results found for: \"HBA1C\", \"[id number]\"  Lab Results   Component Value Date/Time    CHOL 168 06/06/2023 07:52 AM    HDL 82 06/06/2023 07:52 AM     No results found for: \"VITD3\", \"VD3RIA\"    No results found for: \"TSH\", \"TSH2\", \"TSH3\"

## 2023-12-01 NOTE — TELEPHONE ENCOUNTER
Call patient.  I refilled their medication but they are due to be seen in the office.  Reason: BP and meds.

## 2023-12-08 RX ORDER — EZETIMIBE 10 MG/1
10 TABLET ORAL DAILY
Qty: 90 TABLET | Refills: 1 | Status: SHIPPED | OUTPATIENT
Start: 2023-12-08

## 2023-12-08 NOTE — TELEPHONE ENCOUNTER
PCP: Galina Ortega MD    Last appt: [unfilled]  Future Appointments   Date Time Provider [address]   1/31/2024  1:00 PM Galina Ortega MD [facility]       Requested Prescriptions     Pending Prescriptions Disp Refills    ezetimibe (Chinedu Rivera) 10 MG tablet [Pharmacy Med Name: Ezetimibe 10 MG Oral Tablet] 90 tablet 3     Sig: TAKE 1 TABLET BY MOUTH DAILY       Prior labs and Blood pressures:  BP Readings from Last 3 Encounters:   05/31/23 119/72   06/02/22 (!) 142/83   08/18/21 108/74     Lab Results   Component Value Date/Time     06/06/2023 07:52 AM    K 4.1 06/06/2023 07:52 AM     06/06/2023 07:52 AM    CO2 27 06/06/2023 07:52 AM    BUN 21 06/06/2023 07:52 AM    GFRAA >60 06/21/2022 07:38 AM     No results found for: \"HBA1C\", \"[id number]\"  Lab Results   Component Value Date/Time    CHOL 168 06/06/2023 07:52 AM    HDL 82 06/06/2023 07:52 AM     No results found for: \"VITD3\", \"VD3RIA\"    No results found for: \"TSH\", \"TSH2\", \"TSH3\"

## 2024-01-31 ENCOUNTER — OFFICE VISIT (OUTPATIENT)
Facility: CLINIC | Age: 84
End: 2024-01-31
Payer: MEDICARE

## 2024-01-31 VITALS
TEMPERATURE: 98.4 F | BODY MASS INDEX: 22.38 KG/M2 | OXYGEN SATURATION: 98 % | DIASTOLIC BLOOD PRESSURE: 58 MMHG | HEIGHT: 75 IN | HEART RATE: 56 BPM | SYSTOLIC BLOOD PRESSURE: 110 MMHG | WEIGHT: 180 LBS

## 2024-01-31 DIAGNOSIS — Z00.00 MEDICARE ANNUAL WELLNESS VISIT, SUBSEQUENT: Primary | ICD-10-CM

## 2024-01-31 DIAGNOSIS — E78.00 PURE HYPERCHOLESTEROLEMIA, UNSPECIFIED: ICD-10-CM

## 2024-01-31 DIAGNOSIS — I10 ESSENTIAL (PRIMARY) HYPERTENSION: ICD-10-CM

## 2024-01-31 PROBLEM — N18.30 CHRONIC RENAL DISEASE, STAGE III (HCC): Status: RESOLVED | Noted: 2022-06-02 | Resolved: 2024-01-31

## 2024-01-31 PROCEDURE — G8420 CALC BMI NORM PARAMETERS: HCPCS | Performed by: FAMILY MEDICINE

## 2024-01-31 PROCEDURE — G8427 DOCREV CUR MEDS BY ELIG CLIN: HCPCS | Performed by: FAMILY MEDICINE

## 2024-01-31 PROCEDURE — G0439 PPPS, SUBSEQ VISIT: HCPCS | Performed by: FAMILY MEDICINE

## 2024-01-31 PROCEDURE — 99213 OFFICE O/P EST LOW 20 MIN: CPT | Performed by: FAMILY MEDICINE

## 2024-01-31 PROCEDURE — 3074F SYST BP LT 130 MM HG: CPT | Performed by: FAMILY MEDICINE

## 2024-01-31 PROCEDURE — G8484 FLU IMMUNIZE NO ADMIN: HCPCS | Performed by: FAMILY MEDICINE

## 2024-01-31 PROCEDURE — 1123F ACP DISCUSS/DSCN MKR DOCD: CPT | Performed by: FAMILY MEDICINE

## 2024-01-31 PROCEDURE — 1036F TOBACCO NON-USER: CPT | Performed by: FAMILY MEDICINE

## 2024-01-31 PROCEDURE — 3078F DIAST BP <80 MM HG: CPT | Performed by: FAMILY MEDICINE

## 2024-01-31 ASSESSMENT — PATIENT HEALTH QUESTIONNAIRE - PHQ9
SUM OF ALL RESPONSES TO PHQ QUESTIONS 1-9: 0
SUM OF ALL RESPONSES TO PHQ9 QUESTIONS 1 & 2: 0
2. FEELING DOWN, DEPRESSED OR HOPELESS: 0
SUM OF ALL RESPONSES TO PHQ QUESTIONS 1-9: 0
1. LITTLE INTEREST OR PLEASURE IN DOING THINGS: 0

## 2024-01-31 ASSESSMENT — ENCOUNTER SYMPTOMS
SHORTNESS OF BREATH: 0
BLOOD IN STOOL: 0
DIARRHEA: 0

## 2024-01-31 ASSESSMENT — LIFESTYLE VARIABLES
HOW MANY STANDARD DRINKS CONTAINING ALCOHOL DO YOU HAVE ON A TYPICAL DAY: 1 OR 2
HOW OFTEN DO YOU HAVE A DRINK CONTAINING ALCOHOL: 2-3 TIMES A WEEK

## 2024-01-31 NOTE — PROGRESS NOTES
Identified pt with two pt identifiers(name and ). Reviewed record in preparation for visit and have obtained necessary documentation. All patient medications has been reviewed.  Chief Complaint   Patient presents with    Medicare AWV       Vitals:    24 1333   BP: (!) 110/58   Pulse:    Temp:    SpO2:                    Coordination of Care Questionnaire:   1) Have you been to an emergency room, urgent care, or hospitalized since your last visit?   no      2. Have seen or consulted any other health care provider since your last visit? no

## 2024-01-31 NOTE — PROGRESS NOTES
John Shah  83 y.o. male  1940  MRN:[id number]  [facility]  Progress Note     Encounter Date: 1/31/2024    Assessment and Plan:       ICD-10-CM    1. Medicare annual wellness visit, subsequent  Z00.00       2. Essential (primary) hypertension  I10       3. Pure hypercholesterolemia, unspecified  E78.00         1. Medicare annual wellness visit, subsequent  Updated  Vaccines discussed  2. Essential (primary) hypertension  At goal   3. Pure hypercholesterolemia, unspecified  On statin.         I have discussed the diagnosis with the patient and the intended plan as seen in the above orders.  he has expressed understanding.  The patient has received an after-visit summary and questions were answered concerning future plans.  I have discussed medication side effects and warnings with the patient as well.    Electronically Signed: Sam Borja MD    Current Medications after this visit     Current Outpatient Medications   Medication Sig    ezetimibe (ZETIA) 10 MG tablet TAKE 1 TABLET BY MOUTH DAILY    lisinopril (PRINIVIL;ZESTRIL) 10 MG tablet TAKE 1 TABLET BY MOUTH DAILY    amLODIPine (NORVASC) 5 MG tablet TAKE 1 TABLET BY MOUTH DAILY    atorvastatin (LIPITOR) 20 MG tablet TAKE 1 TABLET BY MOUTH ONCE  DAILY    finasteride (PROSCAR) 5 MG tablet TK 1 T PO  ONCE D    tamsulosin (FLOMAX) 0.4 MG capsule Take 1 capsule by mouth daily     No current facility-administered medications for this visit.     There are no discontinued medications.  ~~~~~~~~~~~~~~~~~~~~~~~~~~~~~~~~~~~~~~~~~~~~~~~~~~~~~~~~~~~    Chief Complaint   Patient presents with    Medicare AWV       History provided by patient  History of Present Illness   John Shah is a 83 y.o. male who presents to clinic today for:  Medicare AWV    Low BP noted on vitals  Ran 4.5 miles this AM  Felt fine.  NO dizziness  NO chest pain or dyspnea  NO blood in urine or stool  Takes meds consistently    Cholesterol   On statin and

## 2024-02-02 NOTE — TELEPHONE ENCOUNTER
PCP: Sam Borja MD    Last appt: [unfilled]  No future appointments.    Requested Prescriptions     Pending Prescriptions Disp Refills    amLODIPine (NORVASC) 5 MG tablet [Pharmacy Med Name: amLODIPine Besylate 5 MG Oral Tablet] 90 tablet 3     Sig: TAKE 1 TABLET BY MOUTH DAILY    lisinopril (PRINIVIL;ZESTRIL) 10 MG tablet [Pharmacy Med Name: Lisinopril 10 MG Oral Tablet] 90 tablet 3     Sig: TAKE 1 TABLET BY MOUTH DAILY    atorvastatin (LIPITOR) 20 MG tablet [Pharmacy Med Name: Atorvastatin Calcium 20 MG Oral Tablet] 90 tablet 3     Sig: TAKE 1 TABLET BY MOUTH ONCE  DAILY       Prior labs and Blood pressures:  BP Readings from Last 3 Encounters:   01/31/24 (!) 110/58   05/31/23 119/72   06/02/22 (!) 142/83     Lab Results   Component Value Date/Time     06/06/2023 07:52 AM    K 4.1 06/06/2023 07:52 AM     06/06/2023 07:52 AM    CO2 27 06/06/2023 07:52 AM    BUN 21 06/06/2023 07:52 AM    GFRAA >60 06/21/2022 07:38 AM     No results found for: \"HBA1C\", \"[id number]\"  Lab Results   Component Value Date/Time    CHOL 168 06/06/2023 07:52 AM    HDL 82 06/06/2023 07:52 AM     No results found for: \"VITD3\", \"VD3RIA\"    No results found for: \"TSH\", \"TSH2\", \"TSH3\"

## 2024-02-04 RX ORDER — AMLODIPINE BESYLATE 5 MG/1
5 TABLET ORAL DAILY
Qty: 90 TABLET | Refills: 1 | Status: SHIPPED | OUTPATIENT
Start: 2024-02-04

## 2024-02-04 RX ORDER — ATORVASTATIN CALCIUM 20 MG/1
TABLET, FILM COATED ORAL
Qty: 90 TABLET | Refills: 1 | Status: SHIPPED | OUTPATIENT
Start: 2024-02-04

## 2024-02-04 RX ORDER — LISINOPRIL 10 MG/1
10 TABLET ORAL DAILY
Qty: 90 TABLET | Refills: 1 | Status: SHIPPED | OUTPATIENT
Start: 2024-02-04

## 2024-05-08 RX ORDER — EZETIMIBE 10 MG/1
10 TABLET ORAL DAILY
Qty: 90 TABLET | Refills: 1 | Status: SHIPPED | OUTPATIENT
Start: 2024-05-08

## 2024-05-08 NOTE — TELEPHONE ENCOUNTER
Chief Complaint   Patient presents with    Medication Refill       Requested Prescriptions     Pending Prescriptions Disp Refills    ezetimibe (ZETIA) 10 MG tablet 90 tablet 1     Sig: Take 1 tablet by mouth daily       Allergies:  No Known Allergies    Last visit with clinic:  1/31/2024   Next visit with clinic: Visit date not found     Last visit with this provider: 1/31/2024   Next Visit with this provider: Visit date not found    Signed by Tiffanie Sorensen MA CMA  05/08/24  3:04 PM

## 2024-05-23 RX ORDER — ATORVASTATIN CALCIUM 20 MG/1
TABLET, FILM COATED ORAL
Qty: 90 TABLET | Refills: 1 | Status: SHIPPED | OUTPATIENT
Start: 2024-05-23

## 2024-05-23 RX ORDER — AMLODIPINE BESYLATE 5 MG/1
5 TABLET ORAL DAILY
Qty: 90 TABLET | Refills: 1 | Status: SHIPPED | OUTPATIENT
Start: 2024-05-23

## 2024-05-23 RX ORDER — LISINOPRIL 10 MG/1
10 TABLET ORAL DAILY
Qty: 90 TABLET | Refills: 1 | Status: SHIPPED | OUTPATIENT
Start: 2024-05-23

## 2024-05-23 NOTE — TELEPHONE ENCOUNTER
PCP: Sam Borja MD    Last appt: [unfilled]  No future appointments.    Requested Prescriptions     Pending Prescriptions Disp Refills    lisinopril (PRINIVIL;ZESTRIL) 10 MG tablet [Pharmacy Med Name: Lisinopril 10 MG Oral Tablet] 90 tablet 3     Sig: TAKE 1 TABLET BY MOUTH DAILY    atorvastatin (LIPITOR) 20 MG tablet [Pharmacy Med Name: Atorvastatin Calcium 20 MG Oral Tablet] 90 tablet 3     Sig: TAKE 1 TABLET BY MOUTH ONCE  DAILY    amLODIPine (NORVASC) 5 MG tablet [Pharmacy Med Name: amLODIPine Besylate 5 MG Oral Tablet] 90 tablet 3     Sig: TAKE 1 TABLET BY MOUTH DAILY       Prior labs and Blood pressures:  BP Readings from Last 3 Encounters:   01/31/24 (!) 110/58   05/31/23 119/72   06/02/22 (!) 142/83     Lab Results   Component Value Date/Time     06/06/2023 07:52 AM    K 4.1 06/06/2023 07:52 AM     06/06/2023 07:52 AM    CO2 27 06/06/2023 07:52 AM    BUN 21 06/06/2023 07:52 AM    GFRAA >60 06/21/2022 07:38 AM     No results found for: \"HBA1C\", \"[id number]\"  Lab Results   Component Value Date/Time    CHOL 168 06/06/2023 07:52 AM    HDL 82 06/06/2023 07:52 AM    LDL 71 06/06/2023 07:52 AM    VLDL 15 06/06/2023 07:52 AM     No results found for: \"VITD3\"    No results found for: \"TSH\", \"TSH2\", \"TSH3\"

## 2024-09-13 RX ORDER — EZETIMIBE 10 MG/1
10 TABLET ORAL DAILY
Qty: 90 TABLET | Refills: 1 | Status: SHIPPED | OUTPATIENT
Start: 2024-09-13

## 2024-11-07 RX ORDER — LISINOPRIL 10 MG/1
10 TABLET ORAL DAILY
Qty: 90 TABLET | Refills: 0 | Status: SHIPPED | OUTPATIENT
Start: 2024-11-07

## 2024-11-07 RX ORDER — AMLODIPINE BESYLATE 5 MG/1
5 TABLET ORAL DAILY
Qty: 90 TABLET | Refills: 0 | Status: SHIPPED | OUTPATIENT
Start: 2024-11-07

## 2024-11-07 RX ORDER — ATORVASTATIN CALCIUM 20 MG/1
TABLET, FILM COATED ORAL
Qty: 90 TABLET | Refills: 0 | Status: SHIPPED | OUTPATIENT
Start: 2024-11-07

## 2024-11-07 NOTE — TELEPHONE ENCOUNTER
Call patient.  I refilled their medication but they are due to be seen in the office.  Reason:  Due for office visit and labs.  No additional refills without an in office appointment.

## 2024-12-16 ENCOUNTER — OFFICE VISIT (OUTPATIENT)
Facility: CLINIC | Age: 84
End: 2024-12-16
Payer: MEDICARE

## 2024-12-16 VITALS
RESPIRATION RATE: 17 BRPM | DIASTOLIC BLOOD PRESSURE: 68 MMHG | OXYGEN SATURATION: 96 % | BODY MASS INDEX: 23.15 KG/M2 | WEIGHT: 186.2 LBS | HEART RATE: 58 BPM | TEMPERATURE: 98 F | SYSTOLIC BLOOD PRESSURE: 111 MMHG | HEIGHT: 75 IN

## 2024-12-16 DIAGNOSIS — I10 ESSENTIAL (PRIMARY) HYPERTENSION: Primary | ICD-10-CM

## 2024-12-16 DIAGNOSIS — E78.00 PURE HYPERCHOLESTEROLEMIA, UNSPECIFIED: ICD-10-CM

## 2024-12-16 PROCEDURE — 3078F DIAST BP <80 MM HG: CPT | Performed by: FAMILY MEDICINE

## 2024-12-16 PROCEDURE — G8420 CALC BMI NORM PARAMETERS: HCPCS | Performed by: FAMILY MEDICINE

## 2024-12-16 PROCEDURE — 1126F AMNT PAIN NOTED NONE PRSNT: CPT | Performed by: FAMILY MEDICINE

## 2024-12-16 PROCEDURE — 1123F ACP DISCUSS/DSCN MKR DOCD: CPT | Performed by: FAMILY MEDICINE

## 2024-12-16 PROCEDURE — 3074F SYST BP LT 130 MM HG: CPT | Performed by: FAMILY MEDICINE

## 2024-12-16 PROCEDURE — 1159F MED LIST DOCD IN RCRD: CPT | Performed by: FAMILY MEDICINE

## 2024-12-16 PROCEDURE — G8484 FLU IMMUNIZE NO ADMIN: HCPCS | Performed by: FAMILY MEDICINE

## 2024-12-16 PROCEDURE — 99213 OFFICE O/P EST LOW 20 MIN: CPT | Performed by: FAMILY MEDICINE

## 2024-12-16 PROCEDURE — G8427 DOCREV CUR MEDS BY ELIG CLIN: HCPCS | Performed by: FAMILY MEDICINE

## 2024-12-16 PROCEDURE — 1036F TOBACCO NON-USER: CPT | Performed by: FAMILY MEDICINE

## 2024-12-16 ASSESSMENT — ENCOUNTER SYMPTOMS: SHORTNESS OF BREATH: 0

## 2024-12-16 NOTE — PROGRESS NOTES
\"Have you been to the ER, urgent care clinic since your last visit?  Hospitalized since your last visit?\"    NO    “Have you seen or consulted any other health care providers outside our system since your last visit?”    NO            
normal.         Behavior: Behavior normal.         Thought Content: Thought content normal.         Judgment: Judgment normal.            No results found for this or any previous visit (from the past 24 hour(s)).   Disposition   Return in about 6 months (around 6/16/2025) for Hypertension, Medication follow up.   History   Patient's past medical, surgical and family histories were reviewed and updated.    Past Medical History:   Diagnosis Date    BPH (benign prostatic hyperplasia)     Dog bite 2020    right hand, hospitalized with IV antibiotics    Hypercholesterolemia     Hypertension      Past Surgical History:   Procedure Laterality Date    CARPAL TUNNEL RELEASE        No relevant family history has been documented for this patient.   Social History       Tobacco History       Smoking Status  Never      Smokeless Tobacco Use  Never              Alcohol History       Alcohol Use Status  Yes Amount  1.0 standard drink of alcohol/wk              Drug Use       Drug Use Status  Never              Sexual Activity       Sexually Active  Not Asked                     Allergies   No Known Allergies

## 2024-12-16 NOTE — PATIENT INSTRUCTIONS
The goal blood pressure for most patients is 140/90.    The whole point of treating blood pressure is to prevent strokes, heart attacks and kidney damage.  Persistently elevated blood pressure damages blood vessels and can lead to catastrophic heart problems and strokes.    Recommendations for Hypertension (elevated blood pressure):    Purchase a blood pressure cuff that goes around your upper arm and check blood pressure at least 3 times per week. Write down your numbers for review. Check blood pressure in the morning and evening. Rest for 5 minutes with feet elevated and arm resting on a table (at mid-chest level) when checking blood pressure    Exercise 30-60 minutes most days of the week, preferably with a mix of cardiovascular and strength training. Exercise can improve blood pressure, even if you do not lose weight!    Limit alcohol intake to less than 3-4 drinks per week.   Avoid tobacco products    Avoid illegal drugs especially amphetamines  DASH diet - includes fruits, vegetables, fiber, and less than 2000 mg sodium (salt) daily.    Try to eat a low sugar diet. Sugar worsens diabetes and activates kidney hormones that raise blood pressure.   Avoid non-steroidal inflammatory medications (NSAIDS) such as ibuprofen, advil, motrin, aleve, and naproxyn as these may increase blood pressure if used long-term    Avoid OTC decongestants such as pseudopherine, phenylephrine, Afrin.  Take your blood pressure medicine (and aspirin if prescribed) religiously.          Mediterranean diet: Choose this heart-healthy diet option  The Mediterranean diet is a heart-healthy eating plan combining elements of Mediterranean-style cooking. Here's how to adopt the Mediterranean diet.      If you're looking for a heart-healthy eating plan, the Mediterranean diet might be right for you. The Mediterranean diet incorporates the basics of healthy eating - plus a splash of flavorful olive oil and perhaps a glass of red wine - among

## 2024-12-17 DIAGNOSIS — E78.00 PURE HYPERCHOLESTEROLEMIA, UNSPECIFIED: ICD-10-CM

## 2024-12-17 DIAGNOSIS — I10 ESSENTIAL (PRIMARY) HYPERTENSION: ICD-10-CM

## 2024-12-17 LAB
ALBUMIN SERPL-MCNC: 4 G/DL (ref 3.5–5)
ALBUMIN/GLOB SERPL: 1.6 (ref 1.1–2.2)
ALP SERPL-CCNC: 46 U/L (ref 45–117)
ALT SERPL-CCNC: 26 U/L (ref 12–78)
ANION GAP SERPL CALC-SCNC: 2 MMOL/L (ref 2–12)
AST SERPL-CCNC: 30 U/L (ref 15–37)
BILIRUB DIRECT SERPL-MCNC: 0.2 MG/DL (ref 0–0.2)
BILIRUB SERPL-MCNC: 0.5 MG/DL (ref 0.2–1)
BUN SERPL-MCNC: 25 MG/DL (ref 6–20)
BUN/CREAT SERPL: 23 (ref 12–20)
CALCIUM SERPL-MCNC: 9.3 MG/DL (ref 8.5–10.1)
CHLORIDE SERPL-SCNC: 110 MMOL/L (ref 97–108)
CHOLEST SERPL-MCNC: 171 MG/DL
CO2 SERPL-SCNC: 29 MMOL/L (ref 21–32)
CREAT SERPL-MCNC: 1.09 MG/DL (ref 0.7–1.3)
CREAT UR-MCNC: 78.9 MG/DL
GLOBULIN SER CALC-MCNC: 2.5 G/DL (ref 2–4)
GLUCOSE SERPL-MCNC: 99 MG/DL (ref 65–100)
HDLC SERPL-MCNC: 82 MG/DL
HDLC SERPL: 2.1 (ref 0–5)
LDLC SERPL CALC-MCNC: 76.4 MG/DL (ref 0–100)
MICROALBUMIN UR-MCNC: 1.52 MG/DL
MICROALBUMIN/CREAT UR-RTO: 19 MG/G (ref 0–30)
POTASSIUM SERPL-SCNC: 4.2 MMOL/L (ref 3.5–5.1)
PROT SERPL-MCNC: 6.5 G/DL (ref 6.4–8.2)
SODIUM SERPL-SCNC: 141 MMOL/L (ref 136–145)
TRIGL SERPL-MCNC: 63 MG/DL
VLDLC SERPL CALC-MCNC: 12.6 MG/DL

## 2025-01-22 RX ORDER — LISINOPRIL 10 MG/1
10 TABLET ORAL DAILY
Qty: 90 TABLET | Refills: 1 | Status: SHIPPED | OUTPATIENT
Start: 2025-01-22

## 2025-01-22 RX ORDER — AMLODIPINE BESYLATE 5 MG/1
5 TABLET ORAL DAILY
Qty: 90 TABLET | Refills: 1 | Status: SHIPPED | OUTPATIENT
Start: 2025-01-22

## 2025-01-22 RX ORDER — ATORVASTATIN CALCIUM 20 MG/1
TABLET, FILM COATED ORAL
Qty: 90 TABLET | Refills: 1 | Status: SHIPPED | OUTPATIENT
Start: 2025-01-22

## 2025-01-29 RX ORDER — EZETIMIBE 10 MG/1
10 TABLET ORAL DAILY
Qty: 90 TABLET | Refills: 1 | Status: SHIPPED | OUTPATIENT
Start: 2025-01-29

## 2025-06-20 RX ORDER — AMLODIPINE BESYLATE 5 MG/1
5 TABLET ORAL DAILY
Qty: 90 TABLET | Refills: 0 | Status: SHIPPED | OUTPATIENT
Start: 2025-06-20 | End: 2025-08-21

## 2025-06-20 RX ORDER — LISINOPRIL 10 MG/1
10 TABLET ORAL DAILY
Qty: 90 TABLET | Refills: 0 | Status: SHIPPED | OUTPATIENT
Start: 2025-06-20 | End: 2025-08-21

## 2025-06-20 RX ORDER — ATORVASTATIN CALCIUM 20 MG/1
20 TABLET, FILM COATED ORAL DAILY
Qty: 90 TABLET | Refills: 0 | Status: SHIPPED | OUTPATIENT
Start: 2025-06-20 | End: 2025-08-21

## 2025-07-03 RX ORDER — EZETIMIBE 10 MG/1
10 TABLET ORAL DAILY
Qty: 90 TABLET | Refills: 1 | Status: SHIPPED | OUTPATIENT
Start: 2025-07-03